# Patient Record
Sex: MALE | Race: WHITE | NOT HISPANIC OR LATINO | Employment: OTHER | URBAN - METROPOLITAN AREA
[De-identification: names, ages, dates, MRNs, and addresses within clinical notes are randomized per-mention and may not be internally consistent; named-entity substitution may affect disease eponyms.]

---

## 2017-04-09 ENCOUNTER — COMMUNICATION - HEALTHEAST (OUTPATIENT)
Dept: FAMILY MEDICINE | Facility: CLINIC | Age: 72
End: 2017-04-09

## 2017-04-25 ENCOUNTER — HOSPITAL ENCOUNTER (OUTPATIENT)
Dept: LAB | Age: 72
Setting detail: SPECIMEN
Discharge: HOME | End: 2017-04-25

## 2017-04-25 ENCOUNTER — OFFICE VISIT - HEALTHEAST (OUTPATIENT)
Dept: FAMILY MEDICINE | Facility: CLINIC | Age: 72
End: 2017-04-25

## 2017-04-25 DIAGNOSIS — E78.5 HYPERLIPEMIA: ICD-10-CM

## 2017-04-25 DIAGNOSIS — I10 ESSENTIAL HYPERTENSION: ICD-10-CM

## 2017-04-25 DIAGNOSIS — E11.9 DIABETES (H): ICD-10-CM

## 2017-04-25 DIAGNOSIS — Z00.00 WELL ADULT EXAM: ICD-10-CM

## 2017-04-25 DIAGNOSIS — E66.9 OBESITY (BMI 35.0-39.9 WITHOUT COMORBIDITY): ICD-10-CM

## 2017-04-25 LAB
CHOLEST SERPL-MCNC: 164 MG/DL
FASTING STATUS PATIENT QL REPORTED: YES
HBA1C MFR BLD: 7.3 % (ref 3.5–6)
HDLC SERPL-MCNC: 37 MG/DL
LDLC SERPL CALC-MCNC: 78 MG/DL
PSA SERPL-MCNC: 5.5 NG/ML (ref 0–6.5)
TRIGL SERPL-MCNC: 243 MG/DL

## 2017-04-25 ASSESSMENT — MIFFLIN-ST. JEOR: SCORE: 1770.92

## 2017-04-28 ENCOUNTER — COMMUNICATION - HEALTHEAST (OUTPATIENT)
Dept: FAMILY MEDICINE | Facility: CLINIC | Age: 72
End: 2017-04-28

## 2017-12-01 ENCOUNTER — COMMUNICATION - HEALTHEAST (OUTPATIENT)
Dept: FAMILY MEDICINE | Facility: CLINIC | Age: 72
End: 2017-12-01

## 2017-12-01 DIAGNOSIS — E11.9 DIABETES (H): ICD-10-CM

## 2018-02-06 ENCOUNTER — COMMUNICATION - HEALTHEAST (OUTPATIENT)
Dept: FAMILY MEDICINE | Facility: CLINIC | Age: 73
End: 2018-02-06

## 2018-02-06 DIAGNOSIS — E11.9 DIABETES (H): ICD-10-CM

## 2018-02-06 DIAGNOSIS — I10 ESSENTIAL HYPERTENSION: ICD-10-CM

## 2018-02-06 DIAGNOSIS — E78.5 HYPERLIPEMIA: ICD-10-CM

## 2018-02-13 ENCOUNTER — COMMUNICATION - HEALTHEAST (OUTPATIENT)
Dept: ADMINISTRATIVE | Facility: CLINIC | Age: 73
End: 2018-02-13

## 2018-02-15 ENCOUNTER — COMMUNICATION - HEALTHEAST (OUTPATIENT)
Dept: FAMILY MEDICINE | Facility: CLINIC | Age: 73
End: 2018-02-15

## 2018-02-15 ENCOUNTER — OFFICE VISIT - HEALTHEAST (OUTPATIENT)
Dept: FAMILY MEDICINE | Facility: CLINIC | Age: 73
End: 2018-02-15

## 2018-02-15 DIAGNOSIS — I10 ESSENTIAL HYPERTENSION: ICD-10-CM

## 2018-02-15 DIAGNOSIS — E11.9 DIABETES (H): ICD-10-CM

## 2018-02-15 DIAGNOSIS — Z12.83 SKIN CANCER SCREENING: ICD-10-CM

## 2018-02-15 LAB
ANION GAP SERPL CALCULATED.3IONS-SCNC: 8 MMOL/L (ref 5–18)
BUN SERPL-MCNC: 19 MG/DL (ref 8–28)
CALCIUM SERPL-MCNC: 9.4 MG/DL (ref 8.5–10.5)
CHLORIDE BLD-SCNC: 102 MMOL/L (ref 98–107)
CHOLEST SERPL-MCNC: 183 MG/DL
CO2 SERPL-SCNC: 27 MMOL/L (ref 22–31)
CREAT SERPL-MCNC: 1.48 MG/DL (ref 0.7–1.3)
CREAT UR-MCNC: 159.4 MG/DL
FASTING STATUS PATIENT QL REPORTED: YES
GFR SERPL CREATININE-BSD FRML MDRD: 47 ML/MIN/1.73M2
GLUCOSE BLD-MCNC: 172 MG/DL (ref 70–125)
HBA1C MFR BLD: 8.3 % (ref 3.5–6)
HDLC SERPL-MCNC: 38 MG/DL
LDLC SERPL CALC-MCNC: 92 MG/DL
MICROALBUMIN UR-MCNC: 8.82 MG/DL (ref 0–1.99)
MICROALBUMIN/CREAT UR: 55.3 MG/G
POTASSIUM BLD-SCNC: 4.5 MMOL/L (ref 3.5–5)
SODIUM SERPL-SCNC: 137 MMOL/L (ref 136–145)
TRIGL SERPL-MCNC: 263 MG/DL

## 2018-02-15 ASSESSMENT — MIFFLIN-ST. JEOR: SCORE: 1775.45

## 2018-02-19 ENCOUNTER — COMMUNICATION - HEALTHEAST (OUTPATIENT)
Dept: FAMILY MEDICINE | Facility: CLINIC | Age: 73
End: 2018-02-19

## 2018-02-19 DIAGNOSIS — E11.9 DIABETES (H): ICD-10-CM

## 2018-02-20 ENCOUNTER — COMMUNICATION - HEALTHEAST (OUTPATIENT)
Dept: FAMILY MEDICINE | Facility: CLINIC | Age: 73
End: 2018-02-20

## 2018-02-20 DIAGNOSIS — E11.9 DIABETES (H): ICD-10-CM

## 2018-02-21 ENCOUNTER — OFFICE VISIT - HEALTHEAST (OUTPATIENT)
Dept: FAMILY MEDICINE | Facility: CLINIC | Age: 73
End: 2018-02-21

## 2018-02-21 DIAGNOSIS — H61.21 RIGHT EAR IMPACTED CERUMEN: ICD-10-CM

## 2018-06-07 ENCOUNTER — OFFICE VISIT - HEALTHEAST (OUTPATIENT)
Dept: FAMILY MEDICINE | Facility: CLINIC | Age: 73
End: 2018-06-07

## 2018-06-07 DIAGNOSIS — E11.9 TYPE 2 DIABETES MELLITUS (H): ICD-10-CM

## 2018-06-07 DIAGNOSIS — M54.32 SCIATICA OF LEFT SIDE: ICD-10-CM

## 2018-06-07 DIAGNOSIS — Z12.11 SCREEN FOR COLON CANCER: ICD-10-CM

## 2018-06-07 LAB — HBA1C MFR BLD: 6.7 % (ref 3.5–6)

## 2018-06-07 ASSESSMENT — MIFFLIN-ST. JEOR: SCORE: 1740.86

## 2018-12-03 ENCOUNTER — COMMUNICATION - HEALTHEAST (OUTPATIENT)
Dept: FAMILY MEDICINE | Facility: CLINIC | Age: 73
End: 2018-12-03

## 2018-12-03 DIAGNOSIS — E11.9 DIABETES (H): ICD-10-CM

## 2019-03-05 ENCOUNTER — COMMUNICATION - HEALTHEAST (OUTPATIENT)
Dept: FAMILY MEDICINE | Facility: CLINIC | Age: 74
End: 2019-03-05

## 2019-03-05 DIAGNOSIS — E78.5 HYPERLIPEMIA: ICD-10-CM

## 2019-03-05 DIAGNOSIS — I10 ESSENTIAL HYPERTENSION: ICD-10-CM

## 2019-03-06 ENCOUNTER — COMMUNICATION - HEALTHEAST (OUTPATIENT)
Dept: FAMILY MEDICINE | Facility: CLINIC | Age: 74
End: 2019-03-06

## 2019-03-06 DIAGNOSIS — E11.9 DIABETES (H): ICD-10-CM

## 2019-04-16 ENCOUNTER — COMMUNICATION - HEALTHEAST (OUTPATIENT)
Dept: FAMILY MEDICINE | Facility: CLINIC | Age: 74
End: 2019-04-16

## 2019-04-30 ENCOUNTER — OFFICE VISIT - HEALTHEAST (OUTPATIENT)
Dept: FAMILY MEDICINE | Facility: CLINIC | Age: 74
End: 2019-04-30

## 2019-04-30 DIAGNOSIS — Z12.11 SCREEN FOR COLON CANCER: ICD-10-CM

## 2019-04-30 DIAGNOSIS — E11.9 TYPE 2 DIABETES MELLITUS WITHOUT COMPLICATION, WITHOUT LONG-TERM CURRENT USE OF INSULIN (H): ICD-10-CM

## 2019-04-30 DIAGNOSIS — I10 ESSENTIAL HYPERTENSION: ICD-10-CM

## 2019-04-30 DIAGNOSIS — E66.01 MORBID OBESITY (H): ICD-10-CM

## 2019-04-30 LAB
ALBUMIN SERPL-MCNC: 4.3 G/DL (ref 3.5–5)
ALP SERPL-CCNC: 69 U/L (ref 45–120)
ALT SERPL W P-5'-P-CCNC: 41 U/L (ref 0–45)
ANION GAP SERPL CALCULATED.3IONS-SCNC: 12 MMOL/L (ref 5–18)
AST SERPL W P-5'-P-CCNC: 33 U/L (ref 0–40)
BILIRUB SERPL-MCNC: 0.7 MG/DL (ref 0–1)
BUN SERPL-MCNC: 17 MG/DL (ref 8–28)
CALCIUM SERPL-MCNC: 9.7 MG/DL (ref 8.5–10.5)
CHLORIDE BLD-SCNC: 101 MMOL/L (ref 98–107)
CHOLEST SERPL-MCNC: 173 MG/DL
CO2 SERPL-SCNC: 23 MMOL/L (ref 22–31)
CREAT SERPL-MCNC: 1.51 MG/DL (ref 0.7–1.3)
CREAT UR-MCNC: 170.7 MG/DL
FASTING STATUS PATIENT QL REPORTED: YES
GFR SERPL CREATININE-BSD FRML MDRD: 46 ML/MIN/1.73M2
GLUCOSE BLD-MCNC: 124 MG/DL (ref 70–125)
HBA1C MFR BLD: 7.3 % (ref 3.5–6)
HDLC SERPL-MCNC: 37 MG/DL
LDLC SERPL CALC-MCNC: 81 MG/DL
MICROALBUMIN UR-MCNC: 5.85 MG/DL (ref 0–1.99)
MICROALBUMIN/CREAT UR: 34.3 MG/G
POTASSIUM BLD-SCNC: 4.7 MMOL/L (ref 3.5–5)
PROT SERPL-MCNC: 7.6 G/DL (ref 6–8)
SODIUM SERPL-SCNC: 136 MMOL/L (ref 136–145)
TRIGL SERPL-MCNC: 276 MG/DL

## 2019-04-30 ASSESSMENT — MIFFLIN-ST. JEOR: SCORE: 1753.91

## 2019-05-07 ENCOUNTER — COMMUNICATION - HEALTHEAST (OUTPATIENT)
Dept: FAMILY MEDICINE | Facility: CLINIC | Age: 74
End: 2019-05-07

## 2019-05-13 ENCOUNTER — COMMUNICATION - HEALTHEAST (OUTPATIENT)
Dept: FAMILY MEDICINE | Facility: CLINIC | Age: 74
End: 2019-05-13

## 2019-07-25 ENCOUNTER — COMMUNICATION - HEALTHEAST (OUTPATIENT)
Dept: FAMILY MEDICINE | Facility: CLINIC | Age: 74
End: 2019-07-25

## 2019-07-25 DIAGNOSIS — E78.5 HYPERLIPEMIA: ICD-10-CM

## 2019-07-25 DIAGNOSIS — I10 ESSENTIAL HYPERTENSION: ICD-10-CM

## 2019-07-25 DIAGNOSIS — E11.9 DIABETES (H): ICD-10-CM

## 2019-10-07 ENCOUNTER — COMMUNICATION - HEALTHEAST (OUTPATIENT)
Dept: FAMILY MEDICINE | Facility: CLINIC | Age: 74
End: 2019-10-07

## 2019-10-07 DIAGNOSIS — E11.9 DIABETES (H): ICD-10-CM

## 2020-03-11 ENCOUNTER — COMMUNICATION - HEALTHEAST (OUTPATIENT)
Dept: FAMILY MEDICINE | Facility: CLINIC | Age: 75
End: 2020-03-11

## 2020-03-11 DIAGNOSIS — E78.5 HYPERLIPEMIA: ICD-10-CM

## 2020-03-11 DIAGNOSIS — I10 ESSENTIAL HYPERTENSION: ICD-10-CM

## 2020-03-17 ENCOUNTER — COMMUNICATION - HEALTHEAST (OUTPATIENT)
Dept: FAMILY MEDICINE | Facility: CLINIC | Age: 75
End: 2020-03-17

## 2020-03-17 DIAGNOSIS — H04.123 DRY EYES: ICD-10-CM

## 2020-03-20 ENCOUNTER — COMMUNICATION - HEALTHEAST (OUTPATIENT)
Dept: FAMILY MEDICINE | Facility: CLINIC | Age: 75
End: 2020-03-20

## 2020-06-18 ENCOUNTER — COMMUNICATION - HEALTHEAST (OUTPATIENT)
Dept: FAMILY MEDICINE | Facility: CLINIC | Age: 75
End: 2020-06-18

## 2020-06-18 DIAGNOSIS — H04.123 DRY EYES: ICD-10-CM

## 2020-06-18 DIAGNOSIS — I10 ESSENTIAL HYPERTENSION: ICD-10-CM

## 2020-06-18 DIAGNOSIS — E78.5 HYPERLIPEMIA: ICD-10-CM

## 2020-06-18 DIAGNOSIS — E11.9 DIABETES (H): ICD-10-CM

## 2020-07-06 ENCOUNTER — COMMUNICATION - HEALTHEAST (OUTPATIENT)
Dept: FAMILY MEDICINE | Facility: CLINIC | Age: 75
End: 2020-07-06

## 2020-07-06 ENCOUNTER — OFFICE VISIT - HEALTHEAST (OUTPATIENT)
Dept: FAMILY MEDICINE | Facility: CLINIC | Age: 75
End: 2020-07-06

## 2020-07-06 DIAGNOSIS — E11.9 TYPE 2 DIABETES MELLITUS WITHOUT COMPLICATION, WITHOUT LONG-TERM CURRENT USE OF INSULIN (H): ICD-10-CM

## 2020-07-06 DIAGNOSIS — I10 ESSENTIAL HYPERTENSION: ICD-10-CM

## 2020-07-06 DIAGNOSIS — E78.5 HYPERLIPIDEMIA, UNSPECIFIED HYPERLIPIDEMIA TYPE: ICD-10-CM

## 2020-07-06 DIAGNOSIS — Z11.59 SCREENING FOR VIRAL DISEASE: ICD-10-CM

## 2020-07-06 DIAGNOSIS — E66.01 MORBID OBESITY (H): ICD-10-CM

## 2020-07-09 ENCOUNTER — AMBULATORY - HEALTHEAST (OUTPATIENT)
Dept: NURSING | Facility: CLINIC | Age: 75
End: 2020-07-09

## 2020-07-09 ENCOUNTER — AMBULATORY - HEALTHEAST (OUTPATIENT)
Dept: LAB | Facility: CLINIC | Age: 75
End: 2020-07-09

## 2020-07-09 DIAGNOSIS — E78.5 HYPERLIPIDEMIA, UNSPECIFIED HYPERLIPIDEMIA TYPE: ICD-10-CM

## 2020-07-09 DIAGNOSIS — I10 ESSENTIAL HYPERTENSION: ICD-10-CM

## 2020-07-09 DIAGNOSIS — Z11.59 SCREENING FOR VIRAL DISEASE: ICD-10-CM

## 2020-07-09 DIAGNOSIS — E11.9 TYPE 2 DIABETES MELLITUS WITHOUT COMPLICATION, WITHOUT LONG-TERM CURRENT USE OF INSULIN (H): ICD-10-CM

## 2020-07-09 LAB
ANION GAP SERPL CALCULATED.3IONS-SCNC: 12 MMOL/L (ref 5–18)
CHLORIDE BLD-SCNC: 100 MMOL/L (ref 98–107)
CHOLEST SERPL-MCNC: 151 MG/DL
CO2 SERPL-SCNC: 22 MMOL/L (ref 22–31)
CREAT SERPL-MCNC: 1.58 MG/DL (ref 0.7–1.3)
FASTING STATUS PATIENT QL REPORTED: YES
GFR SERPL CREATININE-BSD FRML MDRD: 43 ML/MIN/1.73M2
HBA1C MFR BLD: 6.1 % (ref 3.5–6)
HDLC SERPL-MCNC: 34 MG/DL
LDLC SERPL CALC-MCNC: 82 MG/DL
POTASSIUM BLD-SCNC: 4.2 MMOL/L (ref 3.5–5)
SODIUM SERPL-SCNC: 134 MMOL/L (ref 136–145)
TRIGL SERPL-MCNC: 176 MG/DL

## 2020-07-10 ENCOUNTER — COMMUNICATION - HEALTHEAST (OUTPATIENT)
Dept: FAMILY MEDICINE | Facility: CLINIC | Age: 75
End: 2020-07-10

## 2020-07-10 LAB — HCV AB SERPL QL IA: NEGATIVE

## 2020-07-23 ENCOUNTER — COMMUNICATION - HEALTHEAST (OUTPATIENT)
Dept: FAMILY MEDICINE | Facility: CLINIC | Age: 75
End: 2020-07-23

## 2020-07-23 DIAGNOSIS — E11.9 DIABETES (H): ICD-10-CM

## 2020-07-23 DIAGNOSIS — H04.123 DRY EYES: ICD-10-CM

## 2020-07-23 DIAGNOSIS — I10 ESSENTIAL HYPERTENSION: ICD-10-CM

## 2020-07-23 DIAGNOSIS — E78.5 HYPERLIPEMIA: ICD-10-CM

## 2020-08-20 ENCOUNTER — COMMUNICATION - HEALTHEAST (OUTPATIENT)
Dept: FAMILY MEDICINE | Facility: CLINIC | Age: 75
End: 2020-08-20

## 2020-08-20 DIAGNOSIS — E78.5 HYPERLIPEMIA: ICD-10-CM

## 2020-08-20 DIAGNOSIS — E11.9 DIABETES (H): ICD-10-CM

## 2020-08-20 DIAGNOSIS — I10 ESSENTIAL HYPERTENSION: ICD-10-CM

## 2020-08-20 DIAGNOSIS — H04.123 DRY EYES: ICD-10-CM

## 2020-09-15 ENCOUNTER — RECORDS - HEALTHEAST (OUTPATIENT)
Dept: ADMINISTRATIVE | Facility: OTHER | Age: 75
End: 2020-09-15

## 2020-09-15 LAB — RETINOPATHY: NEGATIVE

## 2020-09-23 ENCOUNTER — RECORDS - HEALTHEAST (OUTPATIENT)
Dept: HEALTH INFORMATION MANAGEMENT | Facility: CLINIC | Age: 75
End: 2020-09-23

## 2021-01-21 ENCOUNTER — COMMUNICATION - HEALTHEAST (OUTPATIENT)
Dept: FAMILY MEDICINE | Facility: CLINIC | Age: 76
End: 2021-01-21

## 2021-01-21 DIAGNOSIS — H04.123 DRY EYES: ICD-10-CM

## 2021-03-16 ENCOUNTER — COMMUNICATION - HEALTHEAST (OUTPATIENT)
Dept: FAMILY MEDICINE | Facility: CLINIC | Age: 76
End: 2021-03-16

## 2021-03-16 DIAGNOSIS — H04.123 DRY EYES: ICD-10-CM

## 2021-03-16 DIAGNOSIS — I10 ESSENTIAL HYPERTENSION: ICD-10-CM

## 2021-03-16 DIAGNOSIS — E78.5 HYPERLIPEMIA: ICD-10-CM

## 2021-03-16 DIAGNOSIS — E11.9 DIABETES (H): ICD-10-CM

## 2021-03-24 ENCOUNTER — COMMUNICATION - HEALTHEAST (OUTPATIENT)
Dept: FAMILY MEDICINE | Facility: CLINIC | Age: 76
End: 2021-03-24

## 2021-03-24 DIAGNOSIS — H04.123 DRY EYES: ICD-10-CM

## 2021-05-06 ENCOUNTER — OFFICE VISIT (OUTPATIENT)
Dept: FAMILY MEDICINE | Facility: CLINIC | Age: 76
End: 2021-05-06
Payer: MEDICARE

## 2021-05-06 VITALS
WEIGHT: 225 LBS | HEART RATE: 63 BPM | SYSTOLIC BLOOD PRESSURE: 110 MMHG | HEIGHT: 68 IN | DIASTOLIC BLOOD PRESSURE: 80 MMHG | RESPIRATION RATE: 14 BRPM | TEMPERATURE: 97.4 F | BODY MASS INDEX: 34.1 KG/M2 | OXYGEN SATURATION: 95 %

## 2021-05-06 DIAGNOSIS — R97.20 ELEVATED PROSTATE SPECIFIC ANTIGEN (PSA): ICD-10-CM

## 2021-05-06 DIAGNOSIS — E66.01 MORBID OBESITY (H): ICD-10-CM

## 2021-05-06 DIAGNOSIS — K76.0 NAFLD (NONALCOHOLIC FATTY LIVER DISEASE): ICD-10-CM

## 2021-05-06 DIAGNOSIS — E78.5 HYPERLIPIDEMIA LDL GOAL <100: ICD-10-CM

## 2021-05-06 DIAGNOSIS — I10 ESSENTIAL HYPERTENSION: ICD-10-CM

## 2021-05-06 DIAGNOSIS — Z12.5 ENCOUNTER FOR SCREENING FOR MALIGNANT NEOPLASM OF PROSTATE: ICD-10-CM

## 2021-05-06 DIAGNOSIS — E55.9 VITAMIN D DEFICIENCY: ICD-10-CM

## 2021-05-06 DIAGNOSIS — Z00.00 ENCOUNTER FOR MEDICARE ANNUAL WELLNESS EXAM: Primary | ICD-10-CM

## 2021-05-06 DIAGNOSIS — E11.9 TYPE 2 DIABETES MELLITUS WITHOUT COMPLICATION, WITHOUT LONG-TERM CURRENT USE OF INSULIN (H): ICD-10-CM

## 2021-05-06 PROBLEM — K76.89 OTHER CHRONIC NONALCOHOLIC LIVER DISEASE: Status: ACTIVE | Noted: 2021-05-06

## 2021-05-06 LAB
ALBUMIN SERPL-MCNC: 4.1 G/DL (ref 3.4–5)
ALP SERPL-CCNC: 78 U/L (ref 40–150)
ALT SERPL W P-5'-P-CCNC: 36 U/L (ref 0–70)
ANION GAP SERPL CALCULATED.3IONS-SCNC: 6 MMOL/L (ref 3–14)
AST SERPL W P-5'-P-CCNC: 26 U/L (ref 0–45)
BILIRUB SERPL-MCNC: 0.8 MG/DL (ref 0.2–1.3)
BUN SERPL-MCNC: 14 MG/DL (ref 7–30)
CALCIUM SERPL-MCNC: 8.9 MG/DL (ref 8.5–10.1)
CHLORIDE SERPL-SCNC: 100 MMOL/L (ref 94–109)
CHOLEST SERPL-MCNC: 160 MG/DL
CO2 SERPL-SCNC: 27 MMOL/L (ref 20–32)
CREAT SERPL-MCNC: 1.42 MG/DL (ref 0.66–1.25)
CREAT UR-MCNC: 149 MG/DL
GFR SERPL CREATININE-BSD FRML MDRD: 48 ML/MIN/{1.73_M2}
GLUCOSE SERPL-MCNC: 114 MG/DL (ref 70–99)
HBA1C MFR BLD: 6.5 % (ref 0–5.6)
HDLC SERPL-MCNC: 43 MG/DL
LDLC SERPL CALC-MCNC: 84 MG/DL
MICROALBUMIN UR-MCNC: 50 MG/L
MICROALBUMIN/CREAT UR: 33.49 MG/G CR (ref 0–17)
NONHDLC SERPL-MCNC: 117 MG/DL
POTASSIUM SERPL-SCNC: 3.9 MMOL/L (ref 3.4–5.3)
PROT SERPL-MCNC: 8.2 G/DL (ref 6.8–8.8)
PSA SERPL-ACNC: 6.83 UG/L (ref 0–4)
SODIUM SERPL-SCNC: 133 MMOL/L (ref 133–144)
TRIGL SERPL-MCNC: 166 MG/DL

## 2021-05-06 PROCEDURE — 80061 LIPID PANEL: CPT | Performed by: NURSE PRACTITIONER

## 2021-05-06 PROCEDURE — 83036 HEMOGLOBIN GLYCOSYLATED A1C: CPT | Performed by: NURSE PRACTITIONER

## 2021-05-06 PROCEDURE — 36415 COLL VENOUS BLD VENIPUNCTURE: CPT | Performed by: NURSE PRACTITIONER

## 2021-05-06 PROCEDURE — G0103 PSA SCREENING: HCPCS | Performed by: NURSE PRACTITIONER

## 2021-05-06 PROCEDURE — 99214 OFFICE O/P EST MOD 30 MIN: CPT | Mod: 25 | Performed by: NURSE PRACTITIONER

## 2021-05-06 PROCEDURE — 82043 UR ALBUMIN QUANTITATIVE: CPT | Performed by: NURSE PRACTITIONER

## 2021-05-06 PROCEDURE — 80053 COMPREHEN METABOLIC PANEL: CPT | Performed by: NURSE PRACTITIONER

## 2021-05-06 PROCEDURE — G0439 PPPS, SUBSEQ VISIT: HCPCS | Performed by: NURSE PRACTITIONER

## 2021-05-06 RX ORDER — PRAVASTATIN SODIUM 10 MG
10 TABLET ORAL AT BEDTIME
Qty: 90 TABLET | Refills: 3 | Status: SHIPPED | OUTPATIENT
Start: 2021-05-06 | End: 2022-05-12

## 2021-05-06 RX ORDER — DORZOLAMIDE HYDROCHLORIDE AND TIMOLOL MALEATE 20; 5 MG/ML; MG/ML
SOLUTION/ DROPS OPHTHALMIC
COMMUNITY
Start: 2021-03-16 | End: 2023-05-18

## 2021-05-06 RX ORDER — PRAVASTATIN SODIUM 10 MG
TABLET ORAL
COMMUNITY
Start: 2021-03-18 | End: 2021-05-06

## 2021-05-06 RX ORDER — LATANOPROST 50 UG/ML
SOLUTION/ DROPS OPHTHALMIC
COMMUNITY
Start: 2021-04-13 | End: 2021-08-11

## 2021-05-06 RX ORDER — METFORMIN HCL 500 MG
TABLET, EXTENDED RELEASE 24 HR ORAL
COMMUNITY
Start: 2021-03-18 | End: 2021-05-06

## 2021-05-06 RX ORDER — BUTALB/ACETAMINOPHEN/CAFFEINE 50-325-40
30 TABLET ORAL
COMMUNITY

## 2021-05-06 RX ORDER — LANCETS
EACH MISCELLANEOUS
COMMUNITY
Start: 2019-07-25

## 2021-05-06 RX ORDER — LISINOPRIL/HYDROCHLOROTHIAZIDE 10-12.5 MG
TABLET ORAL
COMMUNITY
Start: 2021-03-18 | End: 2021-05-06

## 2021-05-06 RX ORDER — IBUPROFEN 800 MG/1
TABLET, FILM COATED ORAL
COMMUNITY
Start: 2021-01-25 | End: 2024-09-24

## 2021-05-06 RX ORDER — LISINOPRIL/HYDROCHLOROTHIAZIDE 10-12.5 MG
1 TABLET ORAL DAILY
Qty: 90 TABLET | Refills: 3 | Status: SHIPPED | OUTPATIENT
Start: 2021-05-06 | End: 2021-05-07 | Stop reason: SINTOL

## 2021-05-06 RX ORDER — METFORMIN HCL 500 MG
TABLET, EXTENDED RELEASE 24 HR ORAL
Qty: 90 TABLET | Refills: 3 | Status: SHIPPED | OUTPATIENT
Start: 2021-05-06 | End: 2022-04-15

## 2021-05-06 ASSESSMENT — PAIN SCALES - GENERAL: PAINLEVEL: NO PAIN (0)

## 2021-05-06 ASSESSMENT — ACTIVITIES OF DAILY LIVING (ADL): CURRENT_FUNCTION: NO ASSISTANCE NEEDED

## 2021-05-06 ASSESSMENT — MIFFLIN-ST. JEOR: SCORE: 1730.09

## 2021-05-06 NOTE — PATIENT INSTRUCTIONS
Patient Education   Personalized Prevention Plan  You are due for the preventive services outlined below.  Your care team is available to assist you in scheduling these services.  If you have already completed any of these items, please share that information with your care team to update in your medical record.  Health Maintenance Due   Topic Date Due     Diabetic Foot Exam  Never done     Eye Exam  Never done     Colorectal Cancer Screening  Never done     COVID-19 Vaccine (1) Never done     Hepatitis C Screening  Never done     FALL RISK ASSESSMENT  Never done     AORTIC ANEURYSM SCREENING (SYSTEM ASSIGNED)  Never done     Zoster (Shingles) Vaccine (2 of 3) 04/12/2011     Diptheria Tetanus Pertussis (DTAP/TDAP/TD) Vaccine (2 - Td) 03/07/2018       Urinary Incontinence (Male)    Urinary incontinence means not being able to control the release of urine from the bladder.   Causes  Common causes of urinary incontinence in men include:    Infection    Certain medicines    Aging    Poor pelvic muscle tone    Bladder spasms    Obesity    Trouble urinating and fully emptying the bladder (urinary retention)  Other things that can cause incontinence are:     Nervous system diseases    Diabetes    Sleep apnea    Urinary tract infections    Prostate surgery    Pelvic injury  Constipation and smoking have also been identified as risk factors.   Symptoms    Urge incontinence (overactive bladder). This is a sudden urge to urinate. It occurs even though there may not be much urine in the bladder. The need to urinate often during the night is common. It's due to bladder spasms.    Stress incontinence. This is urine leakage that you can't control. It can occur with sneezing, coughing, and other actions that put stress on the bladder.    Treatment  Treatment depends on what is causing the condition. Bladder infections are treated with antibiotics. Urinary retention is treated with a bladder catheter.   Home care  Follow these  guidelines when caring for yourself at home:    Don't have any foods and drinks that may irritate the bladder. This includes:  ? Chocolate  ? Alcohol  ? Caffeine  ? Carbonated drinks  ? Acidic fruits and juices    Limit fluids to 6 to 8 cups a day.    Lose weight if you are overweight. This will reduce your symptoms.    If advised, do regular pelvic muscle-strengthening exercises such as Kegel exercises.    If needed, wear absorbent pads to catch urine. Change the pads often. This is for good hygiene and to prevent skin and bladder infections.    Bathe daily for good hygiene.    If an antibiotic was prescribed to treat a bladder infection, take it until it's finished. Keep taking it even if you are feeling better. This is to make sure your infection has cleared.    If a catheter was left in place, keep bacteria from getting into the collection bag. Don't disconnect the catheter from the collection bag.    Use a leg band to secure the catheter drainage tube, so it does not pull on the catheter. Drain the collection bag when it becomes full. To do this, use the drain spout at the bottom of the bag. Don't disconnect the bag from the catheter.    Don't pull on or try to remove a catheter. The catheter must be removed by a healthcare provider.    If you smoke, stop. Ask your provider for help if you can't do this on your own.  Follow-up care  Follow up with your healthcare provider, or as advised.  When to get medical advice  Call your healthcare provider right away if any of these occur:    Fever over 100.4 F (38 C), or as directed by your provider    Bladder pain or fullness    Belly swelling, nausea, or vomiting    Back pain    Weakness, dizziness, or fainting    If a catheter was left in place, return if:  ? The catheter falls out  ? The catheter stops draining for 6 hours  ? Your urine gets cloudy or smells bad  [facility] last reviewed this educational content on 1/1/2020 2000-2021 [employer]. All  rights reserved. This information is not intended as a substitute for professional medical care. Always follow your healthcare professional's instructions.

## 2021-05-06 NOTE — LETTER
"May 7, 2021      Keyshawn Potter  [address] [facility] [address]        Dear ,    We are writing to inform you of your test results.    Wanted to let you know I am changing your blood pressure medication to see if I can improve your kidney function by discontinuing the \"water pill\" part of the pill.     I also placed a referral to urology to review your elevated PSA to rule out prostate cancer. It looks like you have a  history of elevated PSA in the past.    Your labs show minor impact from diabetes on your kidney function. It would be worthwhile to discontinue the lisinopril-hydrochlorothiazide and have you only take the lisinopril. This may improve your kidney function overall. I will send this to [facility] for you. Your A1c is nice and stable. Your cholesterol is nice and controlled. No other medication changes needed at this time. Follow up with a blood pressure check when you come back up to the clinic for your eye exam. This can be done as a nurse only visit if you would like. It was nice to see you.    Resulted Orders   Comprehensive metabolic panel   Result Value Ref Range    Sodium 133 133 - 144 mmol/L    Potassium 3.9 3.4 - 5.3 mmol/L    Chloride 100 94 - 109 mmol/L    Carbon Dioxide 27 20 - 32 mmol/L    Anion Gap 6 3 - 14 mmol/L    Glucose 114 (H) 70 - 99 mg/dL    Urea Nitrogen 14 7 - 30 mg/dL    Creatinine 1.42 (H) 0.66 - 1.25 mg/dL    GFR Estimate 48 (L) >60 mL/min/[1.73_m2]      Comment:      Non  GFR Calc  Starting 12/18/2018, serum creatinine based estimated GFR (eGFR) will be   calculated using the Chronic Kidney Disease Epidemiology Collaboration   (CKD-EPI) equation.      GFR Estimate If Black 55 (L) >60 mL/min/[1.73_m2]      Comment:       GFR Calc  Starting 12/18/2018, serum creatinine based estimated GFR (eGFR) will be   calculated using the Chronic Kidney Disease Epidemiology Collaboration   (CKD-EPI) equation.      Calcium 8.9 8.5 - 10.1 mg/dL    " Bilirubin Total 0.8 0.2 - 1.3 mg/dL    Albumin 4.1 3.4 - 5.0 g/dL    Protein Total 8.2 6.8 - 8.8 g/dL    Alkaline Phosphatase 78 40 - 150 U/L    ALT 36 0 - 70 U/L    AST 26 0 - 45 U/L   Hemoglobin A1c   Result Value Ref Range    Hemoglobin A1C 6.5 (H) 0 - 5.6 %      Comment:      Normal <5.7% Prediabetes 5.7-6.4%  Diabetes 6.5% or higher - adopted from ADA   consensus guidelines.     Lipid panel reflex to direct LDL Non-fasting   Result Value Ref Range    Cholesterol 160 <200 mg/dL    Triglycerides 166 (H) <150 mg/dL      Comment:      Borderline high:  150-199 mg/dl  High:             200-499 mg/dl  Very high:       >499 mg/dl      HDL Cholesterol 43 >39 mg/dL    LDL Cholesterol Calculated 84 <100 mg/dL      Comment:      Desirable:       <100 mg/dl    Non HDL Cholesterol 117 <130 mg/dL   Albumin Random Urine Quantitative with Creat Ratio   Result Value Ref Range    Creatinine Urine 149 mg/dL    Albumin Urine mg/L 50 mg/L    Albumin Urine mg/g Cr 33.49 (H) 0 - 17 mg/g Cr   PSA, screen   Result Value Ref Range    PSA 6.83 (H) 0 - 4 ug/L      Comment:      Assay Method:  Chemiluminescence using Siemens Vista analyzer       If you have any questions or concerns, please call the clinic at the number listed above.       Sincerely,      Lorene Rene, APRN CNP/nk

## 2021-05-06 NOTE — PROGRESS NOTES
"SUBJECTIVE:   Keyshawn Potter is a 75 year old male who presents for Preventive Visit.      Patient has been advised of split billing requirements and indicates understanding: Yes   Are you in the first 12 months of your Medicare coverage?  No    Healthy Habits:     In general, how would you rate your overall health?  Very good    Frequency of exercise:  6-7 days/week    Duration of exercise:  15-30 minutes    Do you usually eat at least 4 servings of fruit and vegetables a day, include whole grains    & fiber and avoid regularly eating high fat or \"junk\" foods?  Yes    Taking medications regularly:  Yes    Barriers to taking medications:  None    Medication side effects:  None    Ability to successfully perform activities of daily living:  No assistance needed    Home Safety:  No safety concerns identified    Hearing Impairment:  No hearing concerns    In the past 6 months, have you been bothered by leaking of urine? Yes    In general, how would you rate your overall mental or emotional health?  Excellent      PHQ-2 Total Score: 0    Additional concerns today:  Yes    Do you feel safe in your environment? Yes    Have you ever done Advance Care Planning? (For example, a Health Directive, POLST, or a discussion with a medical provider or your loved ones about your wishes): No, advance care planning information given to patient to review.  Patient declined advance care planning discussion at this time.       Fall risk  Fallen 2 or more times in the past year?: No  Any fall with injury in the past year?: No    Cognitive Screening   1) Repeat 3 items (Leader, Season, Table)    2) Clock draw: NORMAL  3) 3 item recall: Recalls 3 objects  Results: 3 items recalled: COGNITIVE IMPAIRMENT LESS LIKELY    Mini-CogTM Copyright LOU Harris. Licensed by the author for use in [facility]; reprinted with permission ([email or web address]). All rights reserved.      Do you have sleep apnea, excessive snoring or daytime drowsiness?: " no    Reviewed and updated as needed this visit by clinical staff  Tobacco  Allergies  Meds  Problems  Med Hx  Surg Hx  Fam Hx  Soc Hx          Reviewed and updated as needed this visit by Provider     Problems            Social History     Tobacco Use     Smoking status: Former Smoker     Smokeless tobacco: Never Used   Substance Use Topics     Alcohol use: Not Currently     If you drink alcohol do you typically have >3 drinks per day or >7 drinks per week? No    Alcohol Use 5/6/2021   Prescreen: >3 drinks/day or >7 drinks/week? No           Diabetes Follow-up    How often are you checking your blood sugar? A few times a week  What time of day are you checking your blood sugars (select all that apply)?  Before meals  Have you had any blood sugars above 200?  No  Have you had any blood sugars below 70?  No    What symptoms do you notice when your blood sugar is low?  None    What concerns do you have today about your diabetes? None     Do you have any of these symptoms? (Select all that apply)  No numbness or tingling in feet.  No redness, sores or blisters on feet.  No complaints of excessive thirst.  No reports of blurry vision.  No significant changes to weight.    Have you had a diabetic eye exam in the last 12 months? No                Hyperlipidemia Follow-Up      Are you regularly taking any medication or supplement to lower your cholesterol?   Yes- pravastatin    Are you having muscle aches or other side effects that you think could be caused by your cholesterol lowering medication?  No    Hypertension Follow-up      Do you check your blood pressure regularly outside of the clinic? No     Are you following a low salt diet? No    Are your blood pressures ever more than 140 on the top number (systolic) OR more   than 90 on the bottom number (diastolic), for example 140/90? No    BP Readings from Last 2 Encounters:   05/06/21 110/80     No results found for: A1C, LDL      Current providers sharing in  "care for this patient include:   Patient Care Team:  Lorene Rene APRN CNP as PCP - General (Nurse Practitioner - Family)    The following health maintenance items are reviewed in Epic and correct as of today:  Health Maintenance Due   Topic Date Due     DIABETIC FOOT EXAM  Never done     EYE EXAM  Never done     COLORECTAL CANCER SCREENING  Never done     COVID-19 Vaccine (1) Never done     HEPATITIS C SCREENING  Never done     FALL RISK ASSESSMENT  Never done     AORTIC ANEURYSM SCREENING (SYSTEM ASSIGNED)  Never done     ZOSTER IMMUNIZATION (2 of 3) 04/12/2011     DTAP/TDAP/TD IMMUNIZATION (2 - Td) 03/07/2018       Advised COVID vaccine.     Review of Systems  Constitutional, HEENT, cardiovascular, pulmonary, gi and gu systems are negative, except as otherwise noted.    OBJECTIVE:   /80 (BP Location: Right arm, Patient Position: Chair, Cuff Size: Adult Large)   Pulse 63   Temp 97.4  F (36.3  C) (Tympanic)   Resp 14   Ht 1.727 m (5' 8\")   Wt 102.1 kg (225 lb)   SpO2 95%   BMI 34.21 kg/m   Estimated body mass index is 34.21 kg/m  as calculated from the following:    Height as of this encounter: 1.727 m (5' 8\").    Weight as of this encounter: 102.1 kg (225 lb).     Physical Exam  GENERAL: healthy, alert and no distress  EYES: Eyes grossly normal to inspection, PERRL and conjunctivae and sclerae normal  HENT: ear canals and TM's normal, nose and mouth without ulcers or lesions  NECK: no adenopathy, no asymmetry, masses, or scars and thyroid normal to palpation  RESP: lungs clear to auscultation - no rales, rhonchi or wheezes  CV: regular rate and rhythm, normal S1 S2, no S3 or S4, no murmur, click or rub, no peripheral edema and peripheral pulses strong  ABDOMEN: soft, nontender, no hepatosplenomegaly, no masses and bowel sounds normal  MS: no gross musculoskeletal defects noted, no edema  SKIN: no suspicious lesions or rashes  NEURO: Normal strength and tone, mentation intact and speech " normal  PSYCH: mentation appears normal, affect normal/bright    Results for orders placed or performed in visit on 05/06/21 (from the past 24 hour(s))   Hemoglobin A1c   Result Value Ref Range    Hemoglobin A1C 6.5 (H) 0 - 5.6 %         ASSESSMENT / PLAN:     1. Encounter for Medicare annual wellness exam  Annual medicare visit completed. I discussed preventative measures with the patient including continuing with lifestyle modifications. I encouraged patient to follow-up yearly for annual preventative care visits and sooner as needed.       2. Type 2 diabetes mellitus without complication, without long-term current use of insulin (H)  Diabetes well controlled. A1c is 6.5%. Refilled metformin for patient.   - Hemoglobin A1c  - Lipid panel reflex to direct LDL Non-fasting  - Albumin Random Urine Quantitative with Creat Ratio    3. Essential hypertension  Refilled lisinopril-hydrochlorothiazide today. Stable BP.   - Comprehensive metabolic panel    4. Vitamin D deficiency  Continue with vitamin d supplement.     5. NAFLD (nonalcoholic fatty liver disease)  Will check liver enzymes.   - Comprehensive metabolic panel    6. Morbid obesity (H)  Encouraged him to continue with diet and exercise as able.     7. Elevated prostate specific antigen (PSA)  Screening today. Has been elevated in the past.   - PSA, screen    8. Hyperlipidemia LDL goal <100  Continue on pravastatin. Refilled today.   - Lipid panel reflex to direct LDL Non-fasting    9. Encounter for screening for malignant neoplasm of prostate   Screening.   - PSA, screen    Patient has been advised of split billing requirements and indicates understanding: Yes  COUNSELING:  Reviewed preventive health counseling, as reflected in patient instructions       Consider AAA screening for ages 65-75 and smoking history       Regular exercise       Healthy diet/nutrition    Estimated body mass index is 34.21 kg/m  as calculated from the following:    Height as of this  "encounter: 1.727 m (5' 8\").    Weight as of this encounter: 102.1 kg (225 lb).    Weight management plan: Discussed healthy diet and exercise guidelines    He reports that he has quit smoking. He has never used smokeless tobacco.      Appropriate preventive services were discussed with this patient, including applicable screening as appropriate for cardiovascular disease, diabetes, osteopenia/osteoporosis, and glaucoma.  As appropriate for age/gender, discussed screening for colorectal cancer, prostate cancer, breast cancer, and cervical cancer. Checklist reviewing preventive services available has been given to the patient.    Reviewed patients plan of care and provided an AVS. The Basic Care Plan (routine screening as documented in Health Maintenance) for Keyshawn meets the Care Plan requirement. This Care Plan has been established and reviewed with the Patient.    Counseling Resources:  ATP IV Guidelines  Pooled Cohorts Equation Calculator  Breast Cancer Risk Calculator  Breast Cancer: Medication to Reduce Risk  FRAX Risk Assessment  ICSI Preventive Guidelines  Dietary Guidelines for Americans, 2010  USDA's MyPlate  ASA Prophylaxis  Lung CA Screening    PATRICIA Alamo CNP  [facility]    Identified Health Risks:    Information on urinary incontinence and treatment options given to patient.  "

## 2021-05-07 ENCOUNTER — COMMUNICATION - HEALTHEAST (OUTPATIENT)
Dept: FAMILY MEDICINE | Facility: CLINIC | Age: 76
End: 2021-05-07

## 2021-05-07 DIAGNOSIS — H04.123 DRY EYES: ICD-10-CM

## 2021-05-07 RX ORDER — LISINOPRIL 10 MG/1
10 TABLET ORAL DAILY
Qty: 90 TABLET | Refills: 3 | Status: SHIPPED | OUTPATIENT
Start: 2021-05-07 | End: 2022-01-04

## 2021-05-07 NOTE — RESULT ENCOUNTER NOTE
Please see other note on labs and let patient know that I also placed a referral to urology to review his elevated PSA to rule out prostate cancer. He has had a history of elevated PSA in the past.   PATRICIA Alamo CNP

## 2021-05-07 NOTE — RESULT ENCOUNTER NOTE
"Letter for patient. Please call and let him know that I am changing his blood pressure medication to see if I can improve his kidney function by discontinuing the \"water pill\" part of the pill.   Keyshawn,   Your labs show minor impact from diabetes on your kidney function. It would be worthwhile to discontinue the lisinopril-hydrochlorothiazide and have you only take the lisinopril. This may improve your kidney function overall. I will send this to [facility] for you. Your A1c is nice and stable. Your cholesterol is nice and controlled. No other medication changes needed at this time. Follow up with a blood pressure check when you come back up to the clinic for your eye exam. This can be done as a nurse only visit if you would like. It was nice to see you.   PATRICIA Alamo CNP  "

## 2021-05-27 NOTE — TELEPHONE ENCOUNTER
Left message to call back for: Pt to call back on mainline to schedule an appointment    Information to relay to patient:  Pt is due for a DM F/U appointment.  Also Pt is also do for an AWV.  Please schedule 2 separate appointments for Pt to see his PCP.

## 2021-05-28 NOTE — TELEPHONE ENCOUNTER
Test Results  Who is calling?:  Patient   Who ordered the test:  Lorene Rene  Type of test: Lab  Date of test:  04.30.19  Where was the test performed:  [facility]   What are your questions/concerns?:  Wants his test results mailed to his home.  Okay to leave a detailed message?: No call back needed

## 2021-05-28 NOTE — PROGRESS NOTES
Assessment/ Plan:       1. Type 2 diabetes mellitus without complication, without long-term current use of insulin (H)  Doing well with current diabetes management.  A1c today is 7.3%.  I have provided him with refills in the beginning of March.  He will let me know when he needs no refills I will then send in another 3 months.  I will recommend that patient increase his metformin from 500 mg daily to 1000 mg daily.  In addition recommend that he continue on his aspirin as well as his pravastatin.  Blood work is pending as of this time further recommendations may change based on blood work.  Referral placed for ophthalmology would like patient to have screening completed.  He will schedule this at his convenience.  Refilled patient's blood glucose test strips today.   - Lipid Cascade  - Comprehensive Metabolic Panel  - Microalbumin, Random Urine  - Glycosylated Hemoglobin A1c  - Ambulatory referral to Ophthalmology  - blood glucose test (ACCU-CHEK CHRISS PLUS TEST STRP) strips; USE ONE STRIP 3 TIMES DAILY  Dispense: 200 strip; Refill: 1    2. Essential hypertension  Blood pressure currently stable on prescribed regimen of lisinopril and hydrochlorothiazide.  Discussed side effects with the patient and encouraged him to follow-up if symptoms are present of dizziness or hypotension.  Recommend continuing efforts at regular cardiovascular exercise and eating a balanced diet that is also low in sodium.  Continue reevaluation every 6 months.    3. Morbid obesity (H)  History of morbid obesity with a BMI of 35.8.  Recommend that he continue to work on his efforts at weight loss and managing a balanced diet and getting regular cardiovascular exercise.  If he were to be successful with this he may be able to reduce his medication needs. The following high BMI interventions were performed this visit: encouragement to exercise, dietary management education, guidance, and counseling and lifestyle education regarding  diet      4. Screen for colon cancer  Fecal occult FIT screening test ordered today and provided to patient.  - Occult Blood(ICT)        Subjective:      Keyshawn Potter is a 73 y.o. male who presents for follow up of diabetes. He has been doing well overall. He checks his blood sugar daily and fasting sugar is 120, he did not bring in a glucose log today.  He continues on his medications as prescribed.  He denies any concerning side effects today.  He denies any chest pain, shortness of breath, or difficulty breathing.  He continues on low-dose lisinopril with hydrochlorothiazide.  He takes 10 mg pravastatin for lipid reduction.  He is requesting a refill of his lancets.  He is declining immunizations today it appears he would be due for pneumococcal and Shingrix.  If he continues to winter in [place].  This year he also went to [place] and [place].  He reports he does his best at eating a balanced diet and staying active.  He is overdue for an ophthalmology exam and reports that he will get that scheduled he typically goes to [facility].  He is also overdue for screening for colon cancer he prefers to do the fecal occult FIT testing.  He denies any numbness or tingling in his feet. He denies any other questions or concerns today.    The following portions of the patient's history were reviewed and updated as appropriate: allergies, current medications and problem list.    Patient Active Problem List   Diagnosis     Fatty Liver     Obesity     Serology Prostate-specific Antigen (PSA) Elevated     Dysplastic Nevus     Vitamin D Deficiency     Essential Hypertension     Type 2 diabetes mellitus (H)       Current Outpatient Medications   Medication Sig     aspirin 81 MG EC tablet Take 81 mg by mouth daily.     blood glucose test (ACCU-CHEK CHRISS PLUS TEST STRP) strips USE ONE STRIP 3 TIMES DAILY     co-enzyme Q-10 30 mg capsule Take 30 mg by mouth 3 (three) times a day.     cyclobenzaprine (FLEXERIL) 5 MG tablet  "Take 1 tablet (5 mg total) by mouth at bedtime as needed for muscle spasms.     lancets (ULTRA THIN LANCETS) 30 gauge Misc Check blood sugar 3 times daily     lisinopril-hydrochlorothiazide (ZESTORETIC) 10-12.5 mg per tablet Take 1 tablet by mouth daily.     metFORMIN (GLUCOPHAGE-XR) 500 MG 24 hr tablet TAKE 2 TABLETS BY MOUTH ONCE DAILY WITH  SUPPER     pravastatin (PRAVACHOL) 10 MG tablet TAKE ONE TABLET BY MOUTH AT BEDTIME       Review of Systems   A 12 point comprehensive review of systems was negative except as noted.      Objective:      /78   Pulse 88   Resp 16   Ht 5' 7.5\" (1.715 m)   Wt (!) 232 lb (105.2 kg)   BMI 35.80 kg/m      General appearance: alert, appears stated age and cooperative  Head: Normocephalic, without obvious abnormality, atraumatic  Neck: no adenopathy, no carotid bruit, no JVD, supple, symmetrical, trachea midline and thyroid not enlarged, symmetric, no tenderness/mass/nodules  Lungs: clear to auscultation bilaterally  Heart: regular rate and rhythm, S1, S2 normal, no murmur, click, rub or gallop  Abdomen: Umbilical hernia present, obese.   Extremities: extremities normal, atraumatic, no cyanosis or edema  Skin: Skin color, texture, turgor normal. No rashes or lesions  Neurologic: Grossly normal, monofilament exam wnl      Results for orders placed or performed in visit on 04/30/19   Glycosylated Hemoglobin A1c   Result Value Ref Range    Hemoglobin A1c 7.3 (H) 3.5 - 6.0 %       Lorene Rene CNP  10:14 AM  "

## 2021-05-28 NOTE — TELEPHONE ENCOUNTER
Left message to call back for: Keyshawn    Information to relay to patient:  LMTCB    Please give patient the number to [facility] to schedule his annual diabetes eye exam.    [phone]    Tran Peñaloza, CMA

## 2021-05-28 NOTE — TELEPHONE ENCOUNTER
Pt is scheduled to see Lorene Rene on Tuesday 4/30/19 at 10:00am for DM follow up - please remind patient he still needs to schedule AWV.

## 2021-05-28 NOTE — TELEPHONE ENCOUNTER
Patient needs to schedule diabetes eye exam.  I spoke with him but he was unable to write the message down because he was driving at the time.  Please give him a call and give him the number to [facility] eye clinic so he can schedule that appointment.  [phone]  Thank you,  Debo Huddleston

## 2021-05-30 VITALS — HEIGHT: 68 IN | WEIGHT: 234 LBS | BODY MASS INDEX: 35.46 KG/M2

## 2021-05-30 NOTE — TELEPHONE ENCOUNTER
Refill Approved    Rx renewed per Medication Renewal Policy. Medication was last renewed on 3/5/19.    Cammy Rosales, [facility] Triage/Med Refill 7/25/2019     Requested Prescriptions   Pending Prescriptions Disp Refills     pravastatin (PRAVACHOL) 10 MG tablet [Pharmacy Med Name: PRAVASTATIN 10MG    TAB] 90 tablet 1     Sig: TAKE 1 TABLET BY MOUTH AT BEDTIME       Statins Refill Protocol (Hmg CoA Reductase Inhibitors) Passed - 7/25/2019 10:42 AM        Passed - PCP or prescribing provider visit in past 12 months      Last office visit with prescriber/PCP: 4/30/2019 Lorene Rene CNP OR same dept: 4/30/2019 Lorene Rene CNP OR same specialty: 4/30/2019 Lorene Rene CNP  Last physical: 4/25/2017 Last MTM visit: Visit date not found   Next visit within 3 mo: Visit date not found  Next physical within 3 mo: Visit date not found  Prescriber OR PCP: Lorene Rene CNP  Last diagnosis associated with med order: 1. Hyperlipemia  - pravastatin (PRAVACHOL) 10 MG tablet [Pharmacy Med Name: PRAVASTATIN 10MG    TAB]; TAKE 1 TABLET BY MOUTH AT BEDTIME  Dispense: 90 tablet; Refill: 1    2. Essential hypertension  - lisinopril-hydrochlorothiazide (PRINZIDE,ZESTORETIC) 10-12.5 mg per tablet [Pharmacy Med Name: LISINOPRIL/HCTZ 10-12.5MG TAB]; TAKE 1 TABLET BY MOUTH ONCE DAILY  Dispense: 90 tablet; Refill: 1    3. Diabetes (H)  - ACCU-CHEK SOFTCLIX LANCETS lancets [Pharmacy Med Name: SOFTCLIX LANCETS    MIS]; USE   TO CHECK GLUCOSE THREE TIMES DAILY DX  E11.9  Dispense: 200 each; Refill: 3    If protocol passes may refill for 12 months if within 3 months of last provider visit (or a total of 15 months).             lisinopril-hydrochlorothiazide (PRINZIDE,ZESTORETIC) 10-12.5 mg per tablet [Pharmacy Med Name: LISINOPRIL/HCTZ 10-12.5MG TAB] 90 tablet 1     Sig: TAKE 1 TABLET BY MOUTH ONCE DAILY       Diuretics/Combination Diuretics Refill Protocol  Passed - 7/25/2019 10:42 AM        Passed - Visit with  PCP or prescribing provider visit in past 12 months     Last office visit with prescriber/PCP: 4/30/2019 Lorene Rene CNP OR same dept: 4/30/2019 Lorene Rene CNP OR same specialty: 4/30/2019 Lorene Rene CNP  Last physical: 4/25/2017 Last MTM visit: Visit date not found   Next visit within 3 mo: Visit date not found  Next physical within 3 mo: Visit date not found  Prescriber OR PCP: Lorene Rene CNP  Last diagnosis associated with med order: 1. Hyperlipemia  - pravastatin (PRAVACHOL) 10 MG tablet [Pharmacy Med Name: PRAVASTATIN 10MG    TAB]; TAKE 1 TABLET BY MOUTH AT BEDTIME  Dispense: 90 tablet; Refill: 1    2. Essential hypertension  - lisinopril-hydrochlorothiazide (PRINZIDE,ZESTORETIC) 10-12.5 mg per tablet [Pharmacy Med Name: LISINOPRIL/HCTZ 10-12.5MG TAB]; TAKE 1 TABLET BY MOUTH ONCE DAILY  Dispense: 90 tablet; Refill: 1    3. Diabetes (H)  - ACCU-CHEK SOFTCLIX LANCETS lancets [Pharmacy Med Name: SOFTCLIX LANCETS    MIS]; USE   TO CHECK GLUCOSE THREE TIMES DAILY DX  E11.9  Dispense: 200 each; Refill: 3    If protocol passes may refill for 12 months if within 3 months of last provider visit (or a total of 15 months).             Passed - Serum Potassium in past 12 months      Lab Results   Component Value Date    Potassium 4.7 04/30/2019             Passed - Serum Sodium in past 12 months      Lab Results   Component Value Date    Sodium 136 04/30/2019             Passed - Blood pressure on file in past 12 months     BP Readings from Last 1 Encounters:   04/30/19 128/78             Passed - Serum Creatinine in past 12 months      Creatinine   Date Value Ref Range Status   04/30/2019 1.51 (H) 0.70 - 1.30 mg/dL Final             generic lancets (ACCU-CHEK SOFTCLIX LANCETS) [Pharmacy Med Name: SOFTCLIX LANCETS    MIS] 200 each 3     Sig: USE   TO CHECK GLUCOSE THREE TIMES DAILY DX  E11.9       Diabetic Supplies Refill Protocol Passed - 7/25/2019 10:42 AM        Passed - Visit with  PCP or prescribing provider visit in last 6 months     Last office visit with prescriber/PCP: 4/30/2019 Lorene Rene CNP OR same dept: 4/30/2019 Lorene Rene CNP OR same specialty: 4/30/2019 Lorene Rene CNP  Last physical: 4/25/2017 Last MTM visit: Visit date not found   Next visit within 3 mo: Visit date not found  Next physical within 3 mo: Visit date not found  Prescriber OR PCP: Lorene Rene CNP  Last diagnosis associated with med order: 1. Hyperlipemia  - pravastatin (PRAVACHOL) 10 MG tablet [Pharmacy Med Name: PRAVASTATIN 10MG    TAB]; TAKE 1 TABLET BY MOUTH AT BEDTIME  Dispense: 90 tablet; Refill: 1    2. Essential hypertension  - lisinopril-hydrochlorothiazide (PRINZIDE,ZESTORETIC) 10-12.5 mg per tablet [Pharmacy Med Name: LISINOPRIL/HCTZ 10-12.5MG TAB]; TAKE 1 TABLET BY MOUTH ONCE DAILY  Dispense: 90 tablet; Refill: 1    3. Diabetes (H)  - ACCU-CHEK SOFTCLIX LANCETS lancets [Pharmacy Med Name: SOFTCLIX LANCETS    MIS]; USE   TO CHECK GLUCOSE THREE TIMES DAILY DX  E11.9  Dispense: 200 each; Refill: 3    If protocol passes may refill for 12 months if within 3 months of last provider visit (or a total of 15 months).             Passed - A1C in last 6 months     Hemoglobin A1c   Date Value Ref Range Status   04/30/2019 7.3 (H) 3.5 - 6.0 % Final

## 2021-05-31 VITALS — HEIGHT: 68 IN | WEIGHT: 235 LBS | BODY MASS INDEX: 35.61 KG/M2

## 2021-06-01 VITALS — BODY MASS INDEX: 34.73 KG/M2 | WEIGHT: 229.13 LBS | HEIGHT: 68 IN

## 2021-06-02 VITALS — WEIGHT: 232 LBS | HEIGHT: 68 IN | BODY MASS INDEX: 35.16 KG/M2

## 2021-06-02 NOTE — TELEPHONE ENCOUNTER
Refill Approved    Rx renewed per Medication Renewal Policy. Medication was last renewed on 12/5/2018.  Last OV 4/30/2019.    Darcie Jc, [facility] Triage/Med Refill 10/8/2019     Requested Prescriptions   Pending Prescriptions Disp Refills     metFORMIN (GLUCOPHAGE-XR) 500 MG 24 hr tablet [Pharmacy Med Name: MetFORMIN ER 500MG  TAB] 180 tablet 3     Sig: TAKE 2 TABLETS BY MOUTH ONCE DAILY WITH  SUPPER       Metformin Refill Protocol Passed - 10/7/2019  9:23 AM        Passed - Blood pressure in last 12 months     BP Readings from Last 1 Encounters:   04/30/19 128/78             Passed - LFT or AST or ALT in last 12 months     Albumin   Date Value Ref Range Status   04/30/2019 4.3 3.5 - 5.0 g/dL Final     Bilirubin, Total   Date Value Ref Range Status   04/30/2019 0.7 0.0 - 1.0 mg/dL Final     Bilirubin, Direct   Date Value Ref Range Status   12/19/2011 0.2 <0.6 mg/dL Final     Alkaline Phosphatase   Date Value Ref Range Status   04/30/2019 69 45 - 120 U/L Final     AST   Date Value Ref Range Status   04/30/2019 33 0 - 40 U/L Final     ALT   Date Value Ref Range Status   04/30/2019 41 0 - 45 U/L Final     Protein, Total   Date Value Ref Range Status   04/30/2019 7.6 6.0 - 8.0 g/dL Final                Passed - GFR or Serum Creatinine in last 6 months     GFR MDRD Non Af Amer   Date Value Ref Range Status   04/30/2019 46 (L) >60 mL/min/1.73m2 Final     GFR MDRD Af Amer   Date Value Ref Range Status   04/30/2019 55 (L) >60 mL/min/1.73m2 Final             Passed - Visit with PCP or prescribing provider visit in last 6 months or next 3 months     Last office visit with prescriber/PCP: 4/30/2019 OR same dept: 4/30/2019 Lorene Rene CNP OR same specialty: 4/30/2019 Lorene Rene CNP Last physical: Visit date not found Last MTM visit: Visit date not found         Next appt within 3 mo: Visit date not found  Next physical within 3 mo: Visit date not found  Prescriber OR PCP: Lorene Rene  CNP  Last diagnosis associated with med order: 1. Diabetes (H)  - metFORMIN (GLUCOPHAGE-XR) 500 MG 24 hr tablet [Pharmacy Med Name: MetFORMIN ER 500MG  TAB]; TAKE 2 TABLETS BY MOUTH ONCE DAILY WITH  SUPPER  Dispense: 180 tablet; Refill: 3     If protocol passes may refill for 12 months if within 3 months of last provider visit (or a total of 15 months).           Passed - A1C in last 6 months     Hemoglobin A1c   Date Value Ref Range Status   04/30/2019 7.3 (H) 3.5 - 6.0 % Final               Passed - Microalbumin in last year      Microalbumin, Random Urine   Date Value Ref Range Status   04/30/2019 5.85 (H) 0.00 - 1.99 mg/dL Final

## 2021-06-06 NOTE — TELEPHONE ENCOUNTER
Pt over due for AWV, A1C, next round of labs due 04/30/2020.    Requesting refills     Left a message on pt VM asking him to call back and set up either his AWV anytime as he is overdue for that and he can schedule his DM check anytime before 4/30/2020 as he is due for that FU by the end of April.    Pt was told he was overdue for AWV last April when he did his DM check (still overdue)

## 2021-06-06 NOTE — TELEPHONE ENCOUNTER
Received a fax from [employer] requesting refill Latanoprost 0.005% sol    Patient last seen 04/30/19    Please review and advise.  Thank you so much!  Tran Peñaloza, CMA

## 2021-06-07 NOTE — TELEPHONE ENCOUNTER
Medication Question or Clarification  Who is calling: pharmacy    What medication are you calling about (include dose and sig)?:   latanoprost (XALATAN) 0.005 % ophthalmic solution  2.5 mL  0  3/17/2020   No    Sig - Route: Administer 0.1 drops to both eyes at bedtime. - Both Eyes        Who prescribed the medication?: PCP    What is your question/concern?: needs sig clarified.  Pharmacy question if dose should be 1 drop, NOT 0.1 drop.    Requested Pharmacy: [facility] [address] [place] [address]      Okay to leave a detailed message?: Yes    Please call pharmacy will dosing correction.

## 2021-06-09 NOTE — TELEPHONE ENCOUNTER
Requested Prescriptions     Pending Prescriptions Disp Refills     latanoprost (XALATAN) 0.005 % ophthalmic solution [Pharmacy Med Name: Latanoprost 0.005 % Ophthalmic Solution] 3 mL 0     Sig: INSTILL 1 DROP INTO EACH EYE AT BEDTIME     lisinopriL-hydrochlorothiazide (PRINZIDE,ZESTORETIC) 10-12.5 mg per tablet 30 tablet 0     Sig: Take 1 tablet by mouth daily. Needs appointment before additional refills.     metFORMIN (GLUCOPHAGE-XR) 500 MG 24 hr tablet 60 tablet 0     Sig: Take 2 tablets (1,000 mg total) by mouth daily with supper. Needs appointment before additional refills.     pravastatin (PRAVACHOL) 10 MG tablet 30 tablet 0     Sig: TAKE 1 TABLET BY MOUTH AT BEDTIME. Needs appointment before additional refills.     Last VV 07/06/2020.

## 2021-06-09 NOTE — PROGRESS NOTES
"Keyshawn Potter is a 74 y.o. male who is being evaluated via a billable telephone visit.      The patient has been notified of following:     \"This telephone visit will be conducted via a call between you and your physician/provider. We have found that certain health care needs can be provided without the need for a physical exam.  This service lets us provide the care you need with a short phone conversation.  If a prescription is necessary we can send it directly to your pharmacy.  If lab work is needed we can place an order for that and you can then stop by our lab to have the test done at a later time.    Telephone visits are billed at different rates depending on your insurance coverage. During this emergency period, for some insurers they may be billed the same as an in-person visit.  Please reach out to your insurance provider with any questions.    If during the course of the call the physician/provider feels a telephone visit is not appropriate, you will not be charged for this service.\"    Patient has given verbal consent to a Telephone visit? Yes    What phone number would you like to be contacted at? [phone]    Patient would like to receive their AVS by AVS Preference: Mail a copy.    Additional provider notes: 74-year-old male, new to me, here today for telephone visit for diabetes check.  He has not seen his PCP in over 1 year.  He is currently taking metformin 1000 mg daily.  Denies any adverse effects of medication such as myalgias.    Hypertension: Currently taking lisinopril-hydrochlorothiazide 10-12.5 mg daily.  No adverse effects of medication such as cough.  No headache, chest pain, vision changes.    He is also currently taking pravastatin 10 mg daily.  No adverse effects of medication such as myalgias.    Assessment/Plan:  1. Type 2 diabetes mellitus without complication, without long-term current use of insulin (H)  Patient is due for hemoglobin A1c check, ordered today.  Once we have " lab tests can refill medications.    - Glycosylated Hemoglobin A1c; Future    2. Morbid obesity (H)  Congratulated patient on 10 pound weight loss, though given his diabetes and hypertension, recommended that patient should continue to work on weight loss to help other comorbidities.    3. Essential hypertension  Unable to check blood pressure at home, though we will check labs today.  - Electrolyte Profile; Future  - Creatinine; Future    4. Hyperlipidemia, unspecified hyperlipidemia type  Fasting lipid labs also ordered today.    5. Screening for viral disease  Discussed one-time hepatitis C screening test, ordered today.  - Hepatitis C Antibody (Anti-HCV); Future    Counseled patient regarding immunizations, including TD and pneumonia 13.  Patient is in agreement to having these, will assist him in scheduling nurse visit.    Phone call duration:  7 minutes  Start time: 8:49am  End Time: 8:56am  Tran Peñaloza CMA

## 2021-06-09 NOTE — TELEPHONE ENCOUNTER
----- Message from Subha Houston MD sent at 7/6/2020  9:24 AM CDT -----  Please assist patient in scheduling fasting lab and nurse (vaccine) appointment.

## 2021-06-09 NOTE — TELEPHONE ENCOUNTER
RN cannot approve Refill Request    RN can NOT refill this medication Protocol failed and NO refill given.       Cammy Rosales, [facility] Triage/Med Refill 6/18/2020    Requested Prescriptions   Pending Prescriptions Disp Refills     metFORMIN (GLUCOPHAGE-XR) 500 MG 24 hr tablet [Pharmacy Med Name: metFORMIN HCl  MG Oral Tablet Extended Release 24 Hour] 180 tablet 3     Sig: TAKE 2 TABLETS BY MOUTH ONCE DAILY WITH SUPPER       Metformin Refill Protocol Failed - 6/18/2020  1:16 PM        Failed - Blood pressure in last 12 months     BP Readings from Last 1 Encounters:   04/30/19 128/78             Failed - LFT or AST or ALT in last 12 months     Albumin   Date Value Ref Range Status   04/30/2019 4.3 3.5 - 5.0 g/dL Final     Bilirubin, Total   Date Value Ref Range Status   04/30/2019 0.7 0.0 - 1.0 mg/dL Final     Bilirubin, Direct   Date Value Ref Range Status   12/19/2011 0.2 <0.6 mg/dL Final     Alkaline Phosphatase   Date Value Ref Range Status   04/30/2019 69 45 - 120 U/L Final     AST   Date Value Ref Range Status   04/30/2019 33 0 - 40 U/L Final     ALT   Date Value Ref Range Status   04/30/2019 41 0 - 45 U/L Final     Protein, Total   Date Value Ref Range Status   04/30/2019 7.6 6.0 - 8.0 g/dL Final                Failed - GFR or Serum Creatinine in last 6 months     GFR MDRD Non Af Amer   Date Value Ref Range Status   04/30/2019 46 (L) >60 mL/min/1.73m2 Final     GFR MDRD Af Amer   Date Value Ref Range Status   04/30/2019 55 (L) >60 mL/min/1.73m2 Final             Failed - Visit with PCP or prescribing provider visit in last 6 months or next 3 months     Last office visit with prescriber/PCP: Visit date not found OR same dept: Visit date not found OR same specialty: 4/30/2019 Lorene Rene CNP Last physical: Visit date not found Last MTM visit: Visit date not found         Next appt within 3 mo: Visit date not found  Next physical within 3 mo: Visit date not found  Prescriber OR PCP: Lorene BURNS  Forslund, CNP  Last diagnosis associated with med order: 1. Diabetes (H)  - metFORMIN (GLUCOPHAGE-XR) 500 MG 24 hr tablet [Pharmacy Med Name: metFORMIN HCl  MG Oral Tablet Extended Release 24 Hour]; TAKE 2 TABLETS BY MOUTH ONCE DAILY WITH  SUPPER  Dispense: 180 tablet; Refill: 0    2. Essential hypertension  - lisinopriL-hydrochlorothiazide (PRINZIDE,ZESTORETIC) 10-12.5 mg per tablet [Pharmacy Med Name: Lisinopril-hydroCHLOROthiazide 10-12.5 MG Oral Tablet]; Take 1 tablet by mouth once daily  Dispense: 90 tablet; Refill: 0    3. Hyperlipemia  - pravastatin (PRAVACHOL) 10 MG tablet [Pharmacy Med Name: Pravastatin Sodium 10 MG Oral Tablet]; TAKE 1 TABLET BY MOUTH AT BEDTIME  Dispense: 90 tablet; Refill: 0    4. Dry eyes  - latanoprost (XALATAN) 0.005 % ophthalmic solution [Pharmacy Med Name: Latanoprost 0.005 % Ophthalmic Solution]; INSTILL 1 DROP INTO EACH EYE AT BEDTIME  Dispense: 3 mL; Refill: 0     If protocol passes may refill for 12 months if within 3 months of last provider visit (or a total of 15 months).           Failed - A1C in last 6 months     Hemoglobin A1c   Date Value Ref Range Status   04/30/2019 7.3 (H) 3.5 - 6.0 % Final               Failed - Microalbumin in last year      Microalbumin, Random Urine   Date Value Ref Range Status   04/30/2019 5.85 (H) 0.00 - 1.99 mg/dL Final                     lisinopriL-hydrochlorothiazide (PRINZIDE,ZESTORETIC) 10-12.5 mg per tablet [Pharmacy Med Name: Lisinopril-hydroCHLOROthiazide 10-12.5 MG Oral Tablet] 90 tablet 3     Sig: Take 1 tablet by mouth once daily       Diuretics/Combination Diuretics Refill Protocol  Failed - 6/18/2020  1:16 PM        Failed - Visit with PCP or prescribing provider visit in past 12 months     Last office visit with prescriber/PCP: 4/30/2019 Lorene Rene CNP OR same dept: Visit date not found OR same specialty: 4/30/2019 Lorene Rene CNP  Last physical: 4/25/2017 Last MTM visit: Visit date not found   Next visit  within 3 mo: Visit date not found  Next physical within 3 mo: Visit date not found  Prescriber OR PCP: Lorene Rene CNP  Last diagnosis associated with med order: 1. Diabetes (H)  - metFORMIN (GLUCOPHAGE-XR) 500 MG 24 hr tablet [Pharmacy Med Name: metFORMIN HCl  MG Oral Tablet Extended Release 24 Hour]; TAKE 2 TABLETS BY MOUTH ONCE DAILY WITH  SUPPER  Dispense: 180 tablet; Refill: 0    2. Essential hypertension  - lisinopriL-hydrochlorothiazide (PRINZIDE,ZESTORETIC) 10-12.5 mg per tablet [Pharmacy Med Name: Lisinopril-hydroCHLOROthiazide 10-12.5 MG Oral Tablet]; Take 1 tablet by mouth once daily  Dispense: 90 tablet; Refill: 0    3. Hyperlipemia  - pravastatin (PRAVACHOL) 10 MG tablet [Pharmacy Med Name: Pravastatin Sodium 10 MG Oral Tablet]; TAKE 1 TABLET BY MOUTH AT BEDTIME  Dispense: 90 tablet; Refill: 0    4. Dry eyes  - latanoprost (XALATAN) 0.005 % ophthalmic solution [Pharmacy Med Name: Latanoprost 0.005 % Ophthalmic Solution]; INSTILL 1 DROP INTO EACH EYE AT BEDTIME  Dispense: 3 mL; Refill: 0    If protocol passes may refill for 12 months if within 3 months of last provider visit (or a total of 15 months).             Failed - Serum Potassium in past 12 months      No results found for: LN-POTASSIUM          Failed - Serum Sodium in past 12 months      No results found for: LN-SODIUM          Failed - Blood pressure on file in past 12 months     BP Readings from Last 1 Encounters:   04/30/19 128/78             Failed - Serum Creatinine in past 12 months      Creatinine   Date Value Ref Range Status   04/30/2019 1.51 (H) 0.70 - 1.30 mg/dL Final                pravastatin (PRAVACHOL) 10 MG tablet [Pharmacy Med Name: Pravastatin Sodium 10 MG Oral Tablet] 90 tablet 3     Sig: TAKE 1 TABLET BY MOUTH AT BEDTIME       Statins Refill Protocol (Hmg CoA Reductase Inhibitors) Failed - 6/18/2020  1:16 PM        Failed - PCP or prescribing provider visit in past 12 months      Last office visit with  prescriber/PCP: 4/30/2019 Lorene Rene CNP OR same dept: Visit date not found OR same specialty: 4/30/2019 Lorene Rene CNP  Last physical: 4/25/2017 Last MTM visit: Visit date not found   Next visit within 3 mo: Visit date not found  Next physical within 3 mo: Visit date not found  Prescriber OR PCP: Lorene Rene CNP  Last diagnosis associated with med order: 1. Diabetes (H)  - metFORMIN (GLUCOPHAGE-XR) 500 MG 24 hr tablet [Pharmacy Med Name: metFORMIN HCl  MG Oral Tablet Extended Release 24 Hour]; TAKE 2 TABLETS BY MOUTH ONCE DAILY WITH  SUPPER  Dispense: 180 tablet; Refill: 0    2. Essential hypertension  - lisinopriL-hydrochlorothiazide (PRINZIDE,ZESTORETIC) 10-12.5 mg per tablet [Pharmacy Med Name: Lisinopril-hydroCHLOROthiazide 10-12.5 MG Oral Tablet]; Take 1 tablet by mouth once daily  Dispense: 90 tablet; Refill: 0    3. Hyperlipemia  - pravastatin (PRAVACHOL) 10 MG tablet [Pharmacy Med Name: Pravastatin Sodium 10 MG Oral Tablet]; TAKE 1 TABLET BY MOUTH AT BEDTIME  Dispense: 90 tablet; Refill: 0    4. Dry eyes  - latanoprost (XALATAN) 0.005 % ophthalmic solution [Pharmacy Med Name: Latanoprost 0.005 % Ophthalmic Solution]; INSTILL 1 DROP INTO EACH EYE AT BEDTIME  Dispense: 3 mL; Refill: 0    If protocol passes may refill for 12 months if within 3 months of last provider visit (or a total of 15 months).                latanoprost (XALATAN) 0.005 % ophthalmic solution [Pharmacy Med Name: Latanoprost 0.005 % Ophthalmic Solution] 3 mL 9     Sig: INSTILL 1 DROP INTO EACH EYE AT BEDTIME       There is no refill protocol information for this order

## 2021-06-09 NOTE — TELEPHONE ENCOUNTER
Pt hasn't been seen since 4/2019. 1 month rx signed but Needs appointment before additional refills.  Please assist patient in scheduling VV appointment.

## 2021-06-10 NOTE — TELEPHONE ENCOUNTER
RN cannot approve Refill Request    RN can NOT refill this medication Protocol failed and NO refill given. Last office visit: 4/30/2019 Lorene Rene CNP Last Physical: 4/25/2017 Last MTM visit: Visit date not found Last visit same specialty: 4/30/2019 Lorene Rene CNP.  Next visit within 3 mo: Visit date not found  Next physical within 3 mo: Visit date not found      Sarahi Mcdonough, [facility] Triage/Med Refill 8/22/2020    Requested Prescriptions   Pending Prescriptions Disp Refills     latanoprost (XALATAN) 0.005 % ophthalmic solution [Pharmacy Med Name: Latanoprost 0.005 % Ophthalmic Solution] 3 mL 0     Sig: INSTILL 1 DROP INTO EACH EYE AT BEDTIME       There is no refill protocol information for this order

## 2021-06-10 NOTE — PROGRESS NOTES
Assessment/ Plan:      1. Well adult exam  Healthy male exam completed today.  Labs are updated as below.  He has been doing well over the last 6 months and has no specific concerns.  He was encouraged to continue with his healthy lifestyle modifications.  I did encourage him to participate in regular exercise as well as eating a balanced diet.  I encouraged him to follow-up in 6 months or sooner as needed.  - Lipid Cascade  - HM2(CBC w/o Differential)  - Basic Metabolic Panel  - PSA, Annual Screen (Prostatic-Specific Antigen)    2. Diabetes  Patient has been controlling his diabetes well in the past.  I did encourage him to monitor his blood sugar more often than once weekly.  I provided him with refills of his test strips as well as lancets.  Basic metabolic panel will be checked today as well as a hemoglobin A1c.  Patient to follow-up in 6 months for diabetes check.  - blood glucose test strips; Use 1 each As Directed daily.  Dispense: 50 strip; Refill: 0  - Basic Metabolic Panel  - Glycosylated Hemoglobin A1c    3. Essential hypertension  Patient is doing well with his blood pressure control.  He is on a low-dose lisinopril with hydrochlorothiazide.  He likely does not require this medication however it is giving him kidney protection.  His blood pressure today is stable.  Blood work checked today and medication refill given.  Patient to follow-up in 1 year for his blood pressure.  - lisinopril-hydrochlorothiazide (ZESTORETIC) 10-12.5 mg per tablet; Take 1 tablet by mouth daily.  Dispense: 90 tablet; Refill: 3  - Basic Metabolic Panel    4. Hyperlipemia  Patient has a history of hyperlipidemia as well as diabetes.  He is doing well on his current dose of anticholesterol medication.  He will continue on this dose.  Blood work will be checked today.  - pravastatin (PRAVACHOL) 10 MG tablet; TAKE ONE TABLET BY MOUTH AT BEDTIME  Dispense: 90 tablet; Refill: 3  - Basic Metabolic Panel  - Glycosylated Hemoglobin  A1c    5. Obesity (BMI 35.0-39.9 without comorbidity)  Patient is considered obese with a BMI of 35.58.  I encouraged him to participate in healthy lifestyle by eating a balanced diet getting regular exercise.  I also discussed decreasing the intake of soda as this does lead to inability to lose weight.  The following high BMI interventions were performed this visit: encouragement to exercise, dietary management education, guidance, and counseling and lifestyle education regarding diet      Subjective:      Keyshawn Potter is a 71 y.o. male who presents for an annual exam. The patient reports that there is not domestic violence in his life. Medication check for hypertension and diabetes. He reports that he checks his blood sugar once weekly.  He reports that his goal blood sugar is less than 200.  He admits that he should be checking his blood sugar more often.  Otherwise feels he is doing well.  He denies any concerns regarding his medications at this time.  He would like some medication refills for his diabetes, hypertension, and cholesterol.  He takes an aspirin on a daily basis as well.  He does admit that he should cut back on his soda consumption.  He reports that he will drink 10-12 cans of diet soda daily.    Healthy Habits:   Regular Exercise: Yes  Sunscreen Use: No  Healthy Diet: No  Dental Visits Regularly: No  Seat Belt: No  Sexually active: No  Monthly Self Testicular Exams:  No  Hemoccults: No  Flex Sig: N/A  Colonoscopy: N/A  Lipid Profile: Yes  Glucose Screen: Yes  Prevention of Osteoporosis: N/A  Last Dexa: N/A  Guns at Home:  Yes  Guns Safety Locks:  No      Immunization History   Administered Date(s) Administered     Influenza, inj, historic 10/25/2010     Pneumo Polysac 23-V 08/12/2009     Td, historic 03/07/2008     Tdap 03/07/2008     ZOSTER 08/12/2009     Immunization status: up to date and documented.    No exam data present    Current Outpatient Prescriptions   Medication Sig Dispense Refill  "    aspirin 81 MG EC tablet Take 81 mg by mouth daily.       blood sugar diagnostic Strp Use 1 strip As Directed daily. 100 strip 3     co-enzyme Q-10 30 mg capsule Take 30 mg by mouth 3 (three) times a day.       LANCETS MISC Use As Directed.       lisinopril-hydrochlorothiazide (PRINZIDE,ZESTORETIC) 10-12.5 mg per tablet TAKE ONE TABLET BY MOUTH ONCE DAILY 30 tablet 0     lisinopril-hydrochlorothiazide (ZESTORETIC) 10-12.5 mg per tablet Take 1 tablet by mouth daily. 90 tablet 3     pravastatin (PRAVACHOL) 10 MG tablet TAKE ONE TABLET BY MOUTH AT BEDTIME 90 tablet 3     UNKNOWN TO PATIENT        No current facility-administered medications for this visit.      Past Medical History:   Diagnosis Date     Diabetes mellitus      Hyperlipidemia      Hypertension      History reviewed. No pertinent surgical history.  Review of patient's allergies indicates no known allergies.  History reviewed. No pertinent family history.  Social History     Social History     Marital status: Single     Spouse name: N/A     Number of children: N/A     Years of education: N/A     Occupational History     Not on file.     Social History Main Topics     Smoking status: Never Smoker     Smokeless tobacco: Never Used     Alcohol use No     Drug use: No     Sexual activity: Not on file     Other Topics Concern     Not on file     Social History Narrative       Review of Systems  General:  Denies problem  Eyes: Denies problem  Ears/Nose/Throat: Denies problem  Cardiovascular: Denies problem  Respiratory:  Denies problem  Gastrointestinal:  Denies problem  Genitourinary: Denies problem  Musculoskeletal:  Denies problem  Skin: Denies problem  Neurologic: Denies problem  Psychiatric: Denies problem  Endocrine: Denies problem  Heme/Lymphatic: Denies problem   Allergic/Immunologic: Denies problem        Objective:     Vitals:    04/25/17 1015   BP: 118/80   Pulse: 88   Resp: 16   Weight: (!) 234 lb (106.1 kg)   Height: 5' 8\" (1.727 m)     Body mass " index is 35.58 kg/(m^2).    Physical  General Appearance: Alert, cooperative, no distress, appears stated age  Head: Normocephalic, without obvious abnormality, atraumatic  Eyes: PERRL, conjunctiva/corneas clear, EOM's intact  Ears: Normal TM's and external ear canals, both ears  Nose: Nares normal, septum midline,mucosa normal, no drainage  Throat: Lips, mucosa, and tongue normal; teeth and gums normal  Neck: Supple, symmetrical, trachea midline, no adenopathy;  thyroid: not enlarged, symmetric, no tenderness/mass/nodules  Back: Symmetric, no curvature, ROM normal, no CVA tenderness  Lungs: Clear to auscultation bilaterally, respirations unlabored  Heart: Regular rate and rhythm, S1 and S2 normal, no murmur, rub, or gallop,  Abdomen: Soft, non-tender, bowel sounds active all four quadrants,  no masses, no organomegaly  Genitourinary: Penis normal. Right testis is descended. Left testis is descended.   Musculoskeletal: Normal range of motion. No joint swelling or deformity.   Extremities: Extremities normal, atraumatic, no cyanosis or edema  Skin: Skin color, texture, turgor normal, no rashes or lesions  Lymph nodes: Cervical, supraclavicular, and axillary nodes normal  Neurologic: He is alert. He has normal reflexes.   Psychiatric: He has a normal mood and affect.

## 2021-06-10 NOTE — TELEPHONE ENCOUNTER
RN cannot approve Refill Request    RN can NOT refill this medication PCP messaged that patient is overdue for Labs. Last office visit: Visit date not found Last Physical: Visit date not found Last MTM visit: Visit date not found Last visit same specialty: 4/30/2019 Lorene Rene CNP.  Next visit within 3 mo: Visit date not found  Next physical within 3 mo: Visit date not found      Sarahi Mcdonough, [facility] Triage/Med Refill 8/22/2020    Requested Prescriptions   Pending Prescriptions Disp Refills     pravastatin (PRAVACHOL) 10 MG tablet [Pharmacy Med Name: Pravastatin Sodium 10 MG Oral Tablet] 30 tablet 0     Sig: TAKE 1 TABLET BY MOUTH ONCE DAILY AT BEDTIME *NEED  APPOINTMENT  FOR  FURTHER  REFILLS*       Statins Refill Protocol (Hmg CoA Reductase Inhibitors) Passed - 8/20/2020  9:10 AM        Passed - PCP or prescribing provider visit in past 12 months      Last office visit with prescriber/PCP: Visit date not found OR same dept: Visit date not found OR same specialty: 4/30/2019 Lorene Rene CNP  Last physical: Visit date not found Last MTM visit: Visit date not found   Next visit within 3 mo: Visit date not found  Next physical within 3 mo: Visit date not found  Prescriber OR PCP: Subha Houston MD  Last diagnosis associated with med order: 1. Hyperlipemia  - pravastatin (PRAVACHOL) 10 MG tablet [Pharmacy Med Name: Pravastatin Sodium 10 MG Oral Tablet]; TAKE 1 TABLET BY MOUTH ONCE DAILY AT BEDTIME *NEED  APPOINTMENT  FOR  FURTHER  REFILLS*  Dispense: 30 tablet; Refill: 0    2. Essential hypertension  - lisinopriL-hydrochlorothiazide (PRINZIDE,ZESTORETIC) 10-12.5 mg per tablet [Pharmacy Med Name: Lisinopril-hydroCHLOROthiazide 10-12.5 MG Oral Tablet]; TAKE 1 TABLET BY MOUTH ONCE DAILY *NEED  APPOINTMENT  FOR  FURTHER  REFILLS*  Dispense: 30 tablet; Refill: 0    3. Diabetes (H)  - metFORMIN (GLUCOPHAGE-XR) 500 MG 24 hr tablet [Pharmacy Med Name: metFORMIN HCl  MG Oral Tablet  Extended Release 24 Hour]; TAKE 2 TABLETS BY MOUTH ONCE DAILY WITH  SUPPER  *NEED  APPOINTMENT  FOR  FURTHER  REFILLS*  Dispense: 60 tablet; Refill: 0    If protocol passes may refill for 12 months if within 3 months of last provider visit (or a total of 15 months).                lisinopriL-hydrochlorothiazide (PRINZIDE,ZESTORETIC) 10-12.5 mg per tablet [Pharmacy Med Name: Lisinopril-hydroCHLOROthiazide 10-12.5 MG Oral Tablet] 30 tablet 0     Sig: TAKE 1 TABLET BY MOUTH ONCE DAILY *NEED  APPOINTMENT  FOR  FURTHER  REFILLS*       Diuretics/Combination Diuretics Refill Protocol  Failed - 8/20/2020  9:10 AM        Failed - Blood pressure on file in past 12 months     BP Readings from Last 1 Encounters:   04/30/19 128/78             Passed - Visit with PCP or prescribing provider visit in past 12 months     Last office visit with prescriber/PCP: Visit date not found OR same dept: Visit date not found OR same specialty: 4/30/2019 Lorene Rene CNP  Last physical: Visit date not found Last MTM visit: Visit date not found   Next visit within 3 mo: Visit date not found  Next physical within 3 mo: Visit date not found  Prescriber OR PCP: Subha Houston MD  Last diagnosis associated with med order: 1. Hyperlipemia  - pravastatin (PRAVACHOL) 10 MG tablet [Pharmacy Med Name: Pravastatin Sodium 10 MG Oral Tablet]; TAKE 1 TABLET BY MOUTH ONCE DAILY AT BEDTIME *NEED  APPOINTMENT  FOR  FURTHER  REFILLS*  Dispense: 30 tablet; Refill: 0    2. Essential hypertension  - lisinopriL-hydrochlorothiazide (PRINZIDE,ZESTORETIC) 10-12.5 mg per tablet [Pharmacy Med Name: Lisinopril-hydroCHLOROthiazide 10-12.5 MG Oral Tablet]; TAKE 1 TABLET BY MOUTH ONCE DAILY *NEED  APPOINTMENT  FOR  FURTHER  REFILLS*  Dispense: 30 tablet; Refill: 0    3. Diabetes (H)  - metFORMIN (GLUCOPHAGE-XR) 500 MG 24 hr tablet [Pharmacy Med Name: metFORMIN HCl  MG Oral Tablet Extended Release 24 Hour]; TAKE 2 TABLETS BY MOUTH ONCE DAILY WITH  SUPPER   *NEED  APPOINTMENT  FOR  FURTHER  REFILLS*  Dispense: 60 tablet; Refill: 0    If protocol passes may refill for 12 months if within 3 months of last provider visit (or a total of 15 months).             Passed - Serum Potassium in past 12 months      Lab Results   Component Value Date    Potassium 4.2 07/09/2020             Passed - Serum Sodium in past 12 months      Lab Results   Component Value Date    Sodium 134 (L) 07/09/2020             Passed - Serum Creatinine in past 12 months      Creatinine   Date Value Ref Range Status   07/09/2020 1.58 (H) 0.70 - 1.30 mg/dL Final                metFORMIN (GLUCOPHAGE-XR) 500 MG 24 hr tablet [Pharmacy Med Name: metFORMIN HCl  MG Oral Tablet Extended Release 24 Hour] 60 tablet 0     Sig: TAKE 2 TABLETS BY MOUTH ONCE DAILY WITH  SUPPER  *NEED  APPOINTMENT  FOR  FURTHER  REFILLS*       Metformin Refill Protocol Failed - 8/20/2020  9:10 AM        Failed - Blood pressure in last 12 months     BP Readings from Last 1 Encounters:   04/30/19 128/78             Failed - LFT or AST or ALT in last 12 months     Albumin   Date Value Ref Range Status   04/30/2019 4.3 3.5 - 5.0 g/dL Final     Bilirubin, Total   Date Value Ref Range Status   04/30/2019 0.7 0.0 - 1.0 mg/dL Final     Bilirubin, Direct   Date Value Ref Range Status   12/19/2011 0.2 <0.6 mg/dL Final     Alkaline Phosphatase   Date Value Ref Range Status   04/30/2019 69 45 - 120 U/L Final     AST   Date Value Ref Range Status   04/30/2019 33 0 - 40 U/L Final     ALT   Date Value Ref Range Status   04/30/2019 41 0 - 45 U/L Final     Protein, Total   Date Value Ref Range Status   04/30/2019 7.6 6.0 - 8.0 g/dL Final                Failed - Visit with PCP or prescribing provider visit in last 6 months or next 3 months     Last office visit with prescriber/PCP: Visit date not found OR same dept: Visit date not found OR same specialty: 4/30/2019 Lorene Rene CNP Last physical: Visit date not found Last MTM visit:  Visit date not found         Next appt within 3 mo: Visit date not found  Next physical within 3 mo: Visit date not found  Prescriber OR PCP: Subha Houston MD  Last diagnosis associated with med order: 1. Hyperlipemia  - pravastatin (PRAVACHOL) 10 MG tablet [Pharmacy Med Name: Pravastatin Sodium 10 MG Oral Tablet]; TAKE 1 TABLET BY MOUTH ONCE DAILY AT BEDTIME *NEED  APPOINTMENT  FOR  FURTHER  REFILLS*  Dispense: 30 tablet; Refill: 0    2. Essential hypertension  - lisinopriL-hydrochlorothiazide (PRINZIDE,ZESTORETIC) 10-12.5 mg per tablet [Pharmacy Med Name: Lisinopril-hydroCHLOROthiazide 10-12.5 MG Oral Tablet]; TAKE 1 TABLET BY MOUTH ONCE DAILY *NEED  APPOINTMENT  FOR  FURTHER  REFILLS*  Dispense: 30 tablet; Refill: 0    3. Diabetes (H)  - metFORMIN (GLUCOPHAGE-XR) 500 MG 24 hr tablet [Pharmacy Med Name: metFORMIN HCl  MG Oral Tablet Extended Release 24 Hour]; TAKE 2 TABLETS BY MOUTH ONCE DAILY WITH  SUPPER  *NEED  APPOINTMENT  FOR  FURTHER  REFILLS*  Dispense: 60 tablet; Refill: 0     If protocol passes may refill for 12 months if within 3 months of last provider visit (or a total of 15 months).           Failed - Microalbumin in last year      Microalbumin, Random Urine   Date Value Ref Range Status   04/30/2019 5.85 (H) 0.00 - 1.99 mg/dL Final                  Passed - GFR or Serum Creatinine in last 6 months     GFR MDRD Non Af Amer   Date Value Ref Range Status   07/09/2020 43 (L) >60 mL/min/1.73m2 Final     GFR MDRD Af Amer   Date Value Ref Range Status   07/09/2020 52 (L) >60 mL/min/1.73m2 Final             Passed - A1C in last 6 months     Hemoglobin A1c   Date Value Ref Range Status   07/09/2020 6.1 (H) 3.5 - 6.0 % Final

## 2021-06-10 NOTE — TELEPHONE ENCOUNTER
Who is calling:  Patient   Reason for Call:  Caller is needing to know why he needs another visit when he already had one in July and got labs done for medications.   Date of last appointment with primary care:   Okay to leave a detailed message: Yes

## 2021-06-14 NOTE — TELEPHONE ENCOUNTER
RN cannot approve Refill Request    RN can NOT refill this medication med is not covered by policy/route to provider. Last office visit: Visit date not found Last Physical: Visit date not found Last MTM visit: Visit date not found Last visit same specialty: 4/30/2019 Lorene Rene, CNP.  Next visit within 3 mo: Visit date not found  Next physical within 3 mo: Visit date not found      Cammy Rosales, [facility] Triage/Med Refill 1/21/2021    Requested Prescriptions   Pending Prescriptions Disp Refills     latanoprost (XALATAN) 0.005 % ophthalmic solution [Pharmacy Med Name: Latanoprost 0.005 % Ophthalmic Solution] 3 mL 0     Sig: INSTILL 1 DROP INTO EACH EYE AT BEDTIME       There is no refill protocol information for this order            Well controlled on current medications, reviewed individually with patient.

## 2021-06-16 NOTE — TELEPHONE ENCOUNTER
2nd attempt: LVM for patient to see if these are for us to refill or if its for his eye care facility to fill these. Also informed him that the [facility] office closed and Anju is at [facility] now and gave the number for there, also provided the number for [facility] in  to get an [facility] care appt. With a new provider if he wants to go that route.

## 2021-06-16 NOTE — TELEPHONE ENCOUNTER
Third Attempt: I mailed a letter to the patient regarding the below and that patient should schedule an [facility] visit as the [facility] has closed. Closing until the patient responds.

## 2021-06-16 NOTE — TELEPHONE ENCOUNTER
1st attempt: LVM for patient to see if these are for us to refill or if its for his eye care facility to fill these. Also informed him that the [facility] office closed and Anju is at [facility] now and gave the number for there, also provided the number for [facility] in  to get an [facility] care appt. With a new provider if he wants to go that route.

## 2021-06-16 NOTE — TELEPHONE ENCOUNTER
RN cannot approve Refill Request    RN can NOT refill this medication PCP messaged that patient is overdue for Labs, Office Visit and Blood Pressure Check and Protocol failed and NO refill given. Last office visit: Visit date not found Last Physical: Visit date not found Last MTM visit: Visit date not found Last visit same specialty: 4/30/2019 Lorene Rene CNP.  Next visit within 3 mo: Visit date not found  Next physical within 3 mo: Visit date not found      Heather Louis, [facility] Triage/Med Refill 3/18/2021    Requested Prescriptions   Pending Prescriptions Disp Refills     lisinopriL-hydrochlorothiazide (PRINZIDE,ZESTORETIC) 10-12.5 mg per tablet [Pharmacy Med Name: Lisinopril-hydroCHLOROthiazide 10-12.5 MG Oral Tablet] 90 tablet 0     Sig: Take 1 tablet by mouth once daily       Diuretics/Combination Diuretics Refill Protocol  Failed - 3/16/2021  9:55 AM        Failed - Blood pressure on file in past 12 months     BP Readings from Last 1 Encounters:   04/30/19 128/78             Passed - Visit with PCP or prescribing provider visit in past 12 months     Last office visit with prescriber/PCP: Visit date not found OR same dept: Visit date not found OR same specialty: 4/30/2019 Lorene Rene CNP  Last physical: Visit date not found Last MTM visit: Visit date not found   Next visit within 3 mo: Visit date not found  Next physical within 3 mo: Visit date not found  Prescriber OR PCP: Radha Thomason MD  Last diagnosis associated with med order: 1. Essential hypertension  - lisinopriL-hydrochlorothiazide (PRINZIDE,ZESTORETIC) 10-12.5 mg per tablet [Pharmacy Med Name: Lisinopril-hydroCHLOROthiazide 10-12.5 MG Oral Tablet]; Take 1 tablet by mouth once daily  Dispense: 90 tablet; Refill: 0    2. Diabetes (H)  - metFORMIN (GLUCOPHAGE-XR) 500 MG 24 hr tablet [Pharmacy Med Name: metFORMIN HCl  MG Oral Tablet Extended Release 24 Hour]; TAKE 2 TABLETS BY MOUTH ONCE DAILY WITH SUPPER  Dispense:  180 tablet; Refill: 0    3. Hyperlipemia  - pravastatin (PRAVACHOL) 10 MG tablet [Pharmacy Med Name: Pravastatin Sodium 10 MG Oral Tablet]; TAKE 1 TABLET BY MOUTH AT BEDTIME.  Dispense: 90 tablet; Refill: 0    If protocol passes may refill for 12 months if within 3 months of last provider visit (or a total of 15 months).             Passed - Serum Potassium in past 12 months      Lab Results   Component Value Date    Potassium 4.2 07/09/2020             Passed - Serum Sodium in past 12 months      Lab Results   Component Value Date    Sodium 134 (L) 07/09/2020             Passed - Serum Creatinine in past 12 months      Creatinine   Date Value Ref Range Status   07/09/2020 1.58 (H) 0.70 - 1.30 mg/dL Final                metFORMIN (GLUCOPHAGE-XR) 500 MG 24 hr tablet [Pharmacy Med Name: metFORMIN HCl  MG Oral Tablet Extended Release 24 Hour] 180 tablet 0     Sig: TAKE 2 TABLETS BY MOUTH ONCE DAILY WITH SUPPER       Metformin Refill Protocol Failed - 3/16/2021  9:55 AM        Failed - Blood pressure in last 12 months     BP Readings from Last 1 Encounters:   04/30/19 128/78             Failed - LFT or AST or ALT in last 12 months     Albumin   Date Value Ref Range Status   04/30/2019 4.3 3.5 - 5.0 g/dL Final     Bilirubin, Total   Date Value Ref Range Status   04/30/2019 0.7 0.0 - 1.0 mg/dL Final     Bilirubin, Direct   Date Value Ref Range Status   12/19/2011 0.2 <0.6 mg/dL Final     Alkaline Phosphatase   Date Value Ref Range Status   04/30/2019 69 45 - 120 U/L Final     AST   Date Value Ref Range Status   04/30/2019 33 0 - 40 U/L Final     ALT   Date Value Ref Range Status   04/30/2019 41 0 - 45 U/L Final     Protein, Total   Date Value Ref Range Status   04/30/2019 7.6 6.0 - 8.0 g/dL Final                Failed - Visit with PCP or prescribing provider visit in last 6 months or next 3 months     Last office visit with prescriber/PCP: Visit date not found OR same dept: Visit date not found OR same specialty:  4/30/2019 Lorene Rene, CNP Last physical: Visit date not found Last MTM visit: Visit date not found         Next appt within 3 mo: Visit date not found  Next physical within 3 mo: Visit date not found  Prescriber OR PCP: Radha Thomason MD  Last diagnosis associated with med order: 1. Essential hypertension  - lisinopriL-hydrochlorothiazide (PRINZIDE,ZESTORETIC) 10-12.5 mg per tablet [Pharmacy Med Name: Lisinopril-hydroCHLOROthiazide 10-12.5 MG Oral Tablet]; Take 1 tablet by mouth once daily  Dispense: 90 tablet; Refill: 0    2. Diabetes (H)  - metFORMIN (GLUCOPHAGE-XR) 500 MG 24 hr tablet [Pharmacy Med Name: metFORMIN HCl  MG Oral Tablet Extended Release 24 Hour]; TAKE 2 TABLETS BY MOUTH ONCE DAILY WITH SUPPER  Dispense: 180 tablet; Refill: 0    3. Hyperlipemia  - pravastatin (PRAVACHOL) 10 MG tablet [Pharmacy Med Name: Pravastatin Sodium 10 MG Oral Tablet]; TAKE 1 TABLET BY MOUTH AT BEDTIME.  Dispense: 90 tablet; Refill: 0     If protocol passes may refill for 12 months if within 3 months of last provider visit (or a total of 15 months).           Failed - A1C in last 6 months     Hemoglobin A1c   Date Value Ref Range Status   07/09/2020 6.1 (H) 3.5 - 6.0 % Final               Failed - Microalbumin in last year      Microalbumin, Random Urine   Date Value Ref Range Status   04/30/2019 5.85 (H) 0.00 - 1.99 mg/dL Final                  Passed - GFR or Serum Creatinine in last 6 months     GFR MDRD Non Af Amer   Date Value Ref Range Status   07/09/2020 43 (L) >60 mL/min/1.73m2 Final     GFR MDRD Af Amer   Date Value Ref Range Status   07/09/2020 52 (L) >60 mL/min/1.73m2 Final                pravastatin (PRAVACHOL) 10 MG tablet [Pharmacy Med Name: Pravastatin Sodium 10 MG Oral Tablet] 90 tablet 0     Sig: TAKE 1 TABLET BY MOUTH AT BEDTIME       Statins Refill Protocol (Hmg CoA Reductase Inhibitors) Passed - 3/16/2021  9:55 AM        Passed - PCP or prescribing provider visit in past 12 months      Last  office visit with prescriber/PCP: Visit date not found OR same dept: Visit date not found OR same specialty: 4/30/2019 Lorene Rene CNP  Last physical: Visit date not found Last MTM visit: Visit date not found   Next visit within 3 mo: Visit date not found  Next physical within 3 mo: Visit date not found  Prescriber OR PCP: Radha Thomason MD  Last diagnosis associated with med order: 1. Essential hypertension  - lisinopriL-hydrochlorothiazide (PRINZIDE,ZESTORETIC) 10-12.5 mg per tablet [Pharmacy Med Name: Lisinopril-hydroCHLOROthiazide 10-12.5 MG Oral Tablet]; Take 1 tablet by mouth once daily  Dispense: 90 tablet; Refill: 0    2. Diabetes (H)  - metFORMIN (GLUCOPHAGE-XR) 500 MG 24 hr tablet [Pharmacy Med Name: metFORMIN HCl  MG Oral Tablet Extended Release 24 Hour]; TAKE 2 TABLETS BY MOUTH ONCE DAILY WITH SUPPER  Dispense: 180 tablet; Refill: 0    3. Hyperlipemia  - pravastatin (PRAVACHOL) 10 MG tablet [Pharmacy Med Name: Pravastatin Sodium 10 MG Oral Tablet]; TAKE 1 TABLET BY MOUTH AT BEDTIME.  Dispense: 90 tablet; Refill: 0    If protocol passes may refill for 12 months if within 3 months of last provider visit (or a total of 15 months).

## 2021-06-16 NOTE — TELEPHONE ENCOUNTER
Please call.  This is a patient of Lorene Rene's.  There is a refill request for his eye drops.  However, this mediation should be prescribed by his eye clinic as they manage that medication.    Also, please notify him that Lorene Rene has transitioned to [facility] [place].  He can follow her there or establish with another provider.

## 2021-06-16 NOTE — TELEPHONE ENCOUNTER
RN cannot approve Refill Request    RN can NOT refill this medication med is not covered by policy/route to provider. Last office visit: Visit date not found Last Physical: Visit date not found Last MTM visit: Visit date not found Last visit same specialty: 4/30/2019 Lorene Rene, CNP.  Next visit within 3 mo: Visit date not found  Next physical within 3 mo: Visit date not found      Jimena Roca, [facility] Triage/Med Refill 3/16/2021    Requested Prescriptions   Pending Prescriptions Disp Refills     latanoprost (XALATAN) 0.005 % ophthalmic solution [Pharmacy Med Name: Latanoprost 0.005 % Ophthalmic Solution] 3 mL 0     Sig: INSTILL 1 DROP INTO EACH EYE AT BEDTIME       There is no refill protocol information for this order

## 2021-06-16 NOTE — PROGRESS NOTES
Assessment/Plan:       1. Diabetes  Hemoglobin A1c is elevated when compared to last check.  I did start him on 500 mg extended release metformin twice daily.  I discussed risks versus benefits of this medication as well as potential side effects.  In addition blood work will be completed as below.  I provided him with refills of his lancets and strips so he can effectively check his blood sugars more often.  With an A1c of 8.3 I would like to see this back down to 7.  Plan following up in 3 months for diabetes check specifically A1c.  - blood glucose test strips; Use 1 each As Directed daily.  Dispense: 200 strip; Refill: 3  - lancets (ULTRA THIN LANCETS) 30 gauge Misc; Check blood sugar daily  Dispense: 200 each; Refill: 3  - metFORMIN (GLUCOPHAGE XR) 500 MG 24 hr tablet; Take 2 tablets (1,000 mg total) by mouth daily with supper.  Dispense: 180 tablet; Refill: 3  - Glycosylated Hemoglobin A1c  - Lipid Cascade  - Basic Metabolic Panel  - Microalbumin, Random Urine    2. Essential hypertension  Blood pressure is well controlled on current medications.  Continue taking these as prescribed.    3. Skin cancer screening  Evaluation of his skin shows some suspicious lesions that may be concerning for an actinic keratosis leading to possible skin cancer.  I would like him to follow-up with a dermatologist for further skin cancer screening.  - Ambulatory referral to Dermatology        Subjective:      Keyshawn Potter is a 72 y.o. male who presents for follow up of his diabetes. He travels to [place] in the winter and he returned a couple weeks ago. He is overall feeling well.  He denies any concerns regarding his diabetes.  He checks his blood sugars on a semi-regular basis and reports that his morning blood sugars typically around 150 mg/dL. he has been controlling his diabetes with dilated only.  He has not been on diabetes medications in the past.  He is currently taking lisinopril with hydrochlorothiazide and has  "had good response with blood pressure control.  He is also on a low-dose statin medication.  He currently also takes a aspirin daily.  He denies any chest pain, shortness of breath, or difficulty breathing.  He also denies any nausea, vomiting, or diarrhea.  In discussing different medications that are available for helping with management of his blood sugars he is open to starting on metformin to help improve his blood sugars overall.  We discussed this today.    The following portions of the patient's history were reviewed and updated as appropriate: allergies, current medications and problem list.    Review of Systems   Pertinent items are noted in HPI.      Objective:      /88  Pulse 84  Resp 16  Ht 5' 8\" (1.727 m)  Wt (!) 235 lb (106.6 kg)  BMI 35.73 kg/m2    General appearance: alert, appears stated age and cooperative  Head: Normocephalic, without obvious abnormality, atraumatic  Lungs: clear to auscultation bilaterally  Heart: regular rate and rhythm, S1, S2 normal, no murmur, click, rub or gallop  Extremities: extremities normal, atraumatic, no cyanosis or edema, monofilament exam within normal limits.  Pulses: 2+ and symmetric  Skin: Skin color, texture, turgor normal. No rashes or lesions.  Multiple small macular lesions on bilateral arms likely from skin damage sun damage.  He also has multiple lesions that are suspicious for actinic keratosis/potentially precancerous.    Neurologic: Grossly normal     Results for orders placed or performed in visit on 02/15/18   Glycosylated Hemoglobin A1c   Result Value Ref Range    Hemoglobin A1c 8.3 (H) 3.5 - 6.0 %       "

## 2021-06-16 NOTE — TELEPHONE ENCOUNTER
RN cannot approve Refill Request    RN can NOT refill this medication med is not covered by policy/route to provider. Last office visit: 4/30/2019 Lorene Rene CNP Last Physical: 4/25/2017 Last MTM visit: Visit date not found Last visit same specialty: 4/30/2019 Lorene Rene CNP.  Next visit within 3 mo: Visit date not found  Next physical within 3 mo: Visit date not found      Jimena Roca, [facility] Triage/Med Refill 3/24/2021    Requested Prescriptions   Pending Prescriptions Disp Refills     latanoprost (XALATAN) 0.005 % ophthalmic solution 3 mL 0       There is no refill protocol information for this order

## 2021-06-16 NOTE — PROGRESS NOTES
Assessment / Impression     1. Right ear impacted cerumen           Plan:     I recommended saline irrigation which was performed by the clinical assistant.  He tolerated the procedure well.  I recommended he avoid using Q-tips in the future.  Tips on how to prevent wax buildup in the future were given.  He may follow-up as needed.    Subjective:      HPI: Keyshawn Potter is a 72 y.o. male who this to the clinic to be evaluated for right ear plugging that he has had over the past week.  He feels like his symptoms may be due to wax buildup which is been an issue for him in the past.  He denies having pain, fevers or drainage from the ear.  He describes making his own ear lavage device at home, but he has also been using Q-tips.        Review of Systems  All other systems reviewed and are negative.     History   Smoking Status     Never Smoker   Smokeless Tobacco     Never Used       No family history on file.    Objective:     /70 (Patient Site: Left Arm, Patient Position: Sitting, Cuff Size: Adult Large)  Pulse 72  Temp 98.1  F (36.7  C) (Oral)   Resp 16  Physical Examination: General appearance - alert, well appearing, and in no distress  Eyes: pupils equal and reactive, extraocular eye movements intact  Ears: Left canal and tympanic membrane are clear.  The right canal is impacted with cerumen.  Once this was removed the tympanic membrane appeared clear.  Mouth: mucous membranes moist, pharynx normal without lesions  Neck: supple, no significant adenopathy  Neurological: alert, oriented, normal speech, no focal findings or movement disorder noted.        Recent Results (from the past 168 hour(s))   Glycosylated Hemoglobin A1c   Result Value Ref Range    Hemoglobin A1c 8.3 (H) 3.5 - 6.0 %   Lipid Cascade   Result Value Ref Range    Cholesterol 183 <=199 mg/dL    Triglycerides 263 (H) <=149 mg/dL    HDL Cholesterol 38 (L) >=40 mg/dL    LDL Calculated 92 <=129 mg/dL    Patient Fasting > 8hrs? Yes    Basic  Metabolic Panel   Result Value Ref Range    Sodium 137 136 - 145 mmol/L    Potassium 4.5 3.5 - 5.0 mmol/L    Chloride 102 98 - 107 mmol/L    CO2 27 22 - 31 mmol/L    Anion Gap, Calculation 8 5 - 18 mmol/L    Glucose 172 (H) 70 - 125 mg/dL    Calcium 9.4 8.5 - 10.5 mg/dL    BUN 19 8 - 28 mg/dL    Creatinine 1.48 (H) 0.70 - 1.30 mg/dL    GFR MDRD Af Amer 57 (L) >60 mL/min/1.73m2    GFR MDRD Non Af Amer 47 (L) >60 mL/min/1.73m2   Microalbumin, Random Urine   Result Value Ref Range    Microalbumin, Random Urine 8.82 (H) 0.00 - 1.99 mg/dL    Creatinine, Urine 159.4 mg/dL    Microalbumin/Creatinine Ratio Random Urine 55.3 (H) <=19.9 mg/g       Current Outpatient Prescriptions   Medication Sig Note     aspirin 81 MG EC tablet Take 81 mg by mouth daily.      blood glucose test strips Use one strip 3 times a day      co-enzyme Q-10 30 mg capsule Take 30 mg by mouth 3 (three) times a day.      lancets (ULTRA THIN LANCETS) 30 gauge Misc Check blood sugar 3 times daily      lisinopril-hydrochlorothiazide (ZESTORETIC) 10-12.5 mg per tablet Take 1 tablet by mouth daily.      metFORMIN (GLUCOPHAGE XR) 500 MG 24 hr tablet Take 2 tablets (1,000 mg total) by mouth daily with supper.      pravastatin (PRAVACHOL) 10 MG tablet TAKE ONE TABLET BY MOUTH AT BEDTIME      UNKNOWN TO PATIENT  4/26/2015: Blood pressure med

## 2021-06-17 NOTE — TELEPHONE ENCOUNTER
RN cannot approve Refill Request    RN can NOT refill this medication med is not covered by policy/route to provider. Last office visit: Visit date not found Last Physical: Visit date not found Last MTM visit: Visit date not found Last visit same specialty: 4/30/2019 Lorene Rene, CNP.  Next visit within 3 mo: Visit date not found  Next physical within 3 mo: Visit date not found      Heather Louis, [facility] Triage/Med Refill 5/7/2021    Requested Prescriptions   Pending Prescriptions Disp Refills     latanoprost (XALATAN) 0.005 % ophthalmic solution [Pharmacy Med Name: Latanoprost 0.005 % Ophthalmic Solution] 3 mL 0     Sig: INSTILL 1 DROP INTO EACH EYE AT BEDTIME       There is no refill protocol information for this order

## 2021-06-18 NOTE — PROGRESS NOTES
Assessment/Plan:     1. Type 2 diabetes mellitus  Type 2 diabetes is managed well on the metformin.  Recommend continuing on this medication.  He successfully decreased his hemoglobin A1c from 8.3% from February down to 6.7% on check today.  Congratulated patient on this effort and encouraged him to continue with these new efforts at managing his diabetes.  Plan on continuing the metformin.  Patient will return to the clinic in 6 months for another diabetes check.  He will return sooner if there is any questions or concerns.  - Glycosylated Hemoglobin A1c  - JIC RED    2. Sciatica of left side  Exam today reveals mild sciatica when he is laying on his back straight leg raise with toe flexion.  I discussed with patient the physical therapy as the initial course of treatment typically however does sound as though it is occurring due to muscle spasming and I opted to give patient a limited supply of Flexeril to take at night after a long day and if he is feeling that his back is spasming.  I discussed risks versus benefits of the medication as well as potential side effects.  He was encouraged to follow-up if symptoms are worsening or becoming more persistent or present on a daily basis.  I did discuss physical therapy he would like to defer this referral for now.    3. Screen for colon cancer  Patient was provided with fecal occult cards today, he has completed these in the past and has no questions.      Subjective:      Keyshawn Potter is a 72 y.o. male who presents for two concerns today.  First concern is regarding sciatica pain.  He reports that intermittently he wakes up and he will have left-sided back pain with pain shooting down his leg to about his knee in the morning.  Symptoms seem to occur after he is done more work the day before.  Initially he thought this was due to his mattress however has deducted that since it occurs with both his motor home mattress as well as his home mattress and stating these  "are both new mattresses that it should not be from this.  He denies any numbness or tingling in his legs.  Pain does not occur on a daily basis.  He is wondering if there is \"a pill that can take care of this\".  He denies any specific loss of range of motion and reports after up and moving pain is typically better and is resolving.  He will take typically one Aleve tablet which does not seem to really help the symptoms.  In terms of his diabetes he reports he has been taking the metformin on a daily basis and doing better with monitoring his diet as well as getting increased activity. He denies any side effects from the medication.   He has lost a little bit of weight as well.  His fasting blood sugars are around 111-120. He only checks once daily. He did not bring in a glucose record today. He denies any chest pain, shortness of breath, palpitations, nausea, vomiting, or diarrhea. He also denies any symptoms of incontinence or difficulties with bowel or bladder function in general.  He is due for colon cancer screening and would prefer to complete the fecal occult testing.    The following portions of the patient's history were reviewed and updated as appropriate: allergies, current medications and problem list.    Review of Systems   Pertinent items are noted in HPI.      Objective:      /70 (Patient Site: Right Arm, Patient Position: Sitting, Cuff Size: Adult Regular)  Pulse 72  Temp 98.6  F (37  C) (Oral)   Resp 16  Ht 5' 7.5\" (1.715 m)  Wt (!) 229 lb 2 oz (103.9 kg)  BMI 35.36 kg/m2    General appearance: alert, appears stated age and cooperative  Head: Normocephalic, without obvious abnormality, atraumatic  Back: symmetric, no curvature. ROM normal. No CVA tenderness. no SI joint pain.   Lungs: clear to auscultation bilaterally  Heart: regular rate and rhythm, S1, S2 normal, no murmur, click, rub or gallop  Abdomen: soft, non-tender; bowel sounds normal; no masses,  no organomegaly and positive for " umbilical hernia.  Extremities: extremities normal, atraumatic, no cyanosis or edema  Neurologic: Alert and oriented X 3, normal strength and tone. Normal symmetric reflexes. Normal coordination and gait          Results for orders placed or performed in visit on 06/07/18   Glycosylated Hemoglobin A1c   Result Value Ref Range    Hemoglobin A1c 6.7 (H) 3.5 - 6.0 %

## 2021-06-20 NOTE — LETTER
Letter by Subha Houston MD at      Author: Subha Houston MD Service: -- Author Type: --    Filed:  Encounter Date: 7/10/2020 Status: (Other)         Keyshawn Potter  [address] [address]             July 10, 2020         Dear Mr. Potter,    Below are the results from your recent visit:    Resulted Orders   Glycosylated Hemoglobin A1c   Result Value Ref Range    Hemoglobin A1c 6.1 (H) 3.5 - 6.0 %   Electrolyte Profile   Result Value Ref Range    Sodium 134 (L) 136 - 145 mmol/L    Potassium 4.2 3.5 - 5.0 mmol/L    CO2 22 22 - 31 mmol/L    Chloride 100 98 - 107 mmol/L    Anion Gap, Calculation 12 5 - 18 mmol/L   Creatinine   Result Value Ref Range    Creatinine 1.58 (H) 0.70 - 1.30 mg/dL    GFR MDRD Af Amer 52 (L) >60 mL/min/1.73m2    GFR MDRD Non Af Amer 43 (L) >60 mL/min/1.73m2   Hepatitis C Antibody (Anti-HCV)   Result Value Ref Range    Hepatitis C Ab Negative Negative   Lipid Cascade   Result Value Ref Range    Cholesterol 151 <=199 mg/dL    Triglycerides 176 (H) <=149 mg/dL    HDL Cholesterol 34 (L) >=40 mg/dL    LDL Calculated 82 <=129 mg/dL    Patient Fasting > 8hrs? Yes        Hemoglobin a1c (diabetes lab) has improved significantly-congratulations!  Triglycerides have also improved.  Remaining labs are stable.  Sodium just mildly decreased.  Hep C screening test is negative (that's good)    Please call with questions or contact us using [employer].    Sincerely,        Electronically signed by Subha Houston MD

## 2021-06-24 NOTE — TELEPHONE ENCOUNTER
RN cannot approve Refill Request    RN can NOT refill this medication overdue for office visits and/or labs.    Ronald Love, [facility] Triage/Med Refill 3/12/2019    Requested Prescriptions   Pending Prescriptions Disp Refills     blood glucose test (ACCU-CHEK CHRISS PLUS TEST STRP) strips [Pharmacy Med Name: ACCU-CHEK CHRISS PLUS  EDWARD] 200 strip 3     Sig: USE ONE STRIP 3 TIMES DAILY    Diabetic Supplies Refill Protocol Failed - 3/12/2019  8:22 AM       Failed - Visit with PCP or prescribing provider visit in last 6 months    Last office visit with prescriber/PCP: 6/7/2018 Lorene Rene CNP OR same dept: 6/7/2018 Lorene Rene CNP OR same specialty: 6/7/2018 Lorene Rene CNP  Last physical: 4/25/2017 Last MTM visit: Visit date not found   Next visit within 3 mo: Visit date not found  Next physical within 3 mo: Visit date not found  Prescriber OR PCP: Lorene Rene CNP  Last diagnosis associated with med order: 1. Diabetes (H)  - ACCU-CHEK CHRISS PLUS TEST STRP strips [Pharmacy Med Name: ACCU-CHEK CHRISS PLUS  EDWARD]; USE ONE STRIP 3 TIMES DAILY  Dispense: 200 strip; Refill: 3    If protocol passes may refill for 12 months if within 3 months of last provider visit (or a total of 15 months).            Failed - A1C in last 6 months    Hemoglobin A1c   Date Value Ref Range Status   06/07/2018 6.7 (H) 3.5 - 6.0 % Final

## 2021-07-03 NOTE — ADDENDUM NOTE
Addendum Note by Radha Thomason MD at 8/27/2020  8:52 AM     Author: Radha Thomason MD Service: -- Author Type: Physician    Filed: 8/27/2020  8:52 AM Encounter Date: 8/20/2020 Status: Signed    : Radha Thomason MD (Physician)    Addended by: RADHA THOMASON on: 8/27/2020 08:52 AM        Modules accepted: Orders

## 2021-07-03 NOTE — ADDENDUM NOTE
Addendum Note by Javi Grullon RT (R) at 4/30/2019 10:00 AM     Author: Javi Grullon RT (R) Service: -- Author Type: Radiologic Technologist    Filed: 5/3/2019  2:27 PM Encounter Date: 4/30/2019 Status: Signed    : Javi Grullon RT (R) (Radiologic Technologist)    Addended by: JAVI GRULLON on: 5/3/2019 02:27 PM        Modules accepted: Orders

## 2021-08-09 ENCOUNTER — TELEPHONE (OUTPATIENT)
Dept: FAMILY MEDICINE | Facility: CLINIC | Age: 76
End: 2021-08-09

## 2021-08-09 DIAGNOSIS — H10.13 ALLERGIC CONJUNCTIVITIS, BILATERAL: Primary | ICD-10-CM

## 2021-08-11 RX ORDER — LATANOPROST 50 UG/ML
SOLUTION/ DROPS OPHTHALMIC
Qty: 7.5 ML | Refills: 0 | Status: SHIPPED | OUTPATIENT
Start: 2021-08-11 | End: 2023-05-19

## 2021-08-11 RX ORDER — LATANOPROST 50 UG/ML
SOLUTION/ DROPS OPHTHALMIC
Qty: 3 ML | Refills: 0 | OUTPATIENT
Start: 2021-08-11

## 2021-08-11 NOTE — TELEPHONE ENCOUNTER
I will provide 1 refill. He will need to establish care with a new PCP due to location.   PATRICIA Alamo CNP

## 2021-11-09 ENCOUNTER — TELEPHONE (OUTPATIENT)
Dept: FAMILY MEDICINE | Facility: CLINIC | Age: 76
End: 2021-11-09
Payer: MEDICARE

## 2021-11-09 NOTE — LETTER
[facility]  [address]  [facility] [address]  [phone]  November 9, 2021    Keyshawn Potter  [address]  [place] [address]    Dear Keyshawn,    We care about your health and have reviewed your health plan including your medical conditions, medication list, and lab results.  Based on this review, it is recommended that you follow up regarding the following health topic(s):     -Diabetes  -Colon Cancer Screening    We recommend you take the following action(s):  -schedule a FOLLOWUP OFFICE APPOINTMENT.  We will perform the following labs:  A1c.  -schedule a COLONOSCOPY to look for colon cancer (due every 10 years or 5 years in higher risk situations.)  Colonoscopies can prevent 90-95% of colon cancer deaths.  Problem lesions can be removed before they ever become cancer.  If you do not wish to do a colonoscopy or cannot afford to do one at this time, there is another option called a Fecal Immunochemical Occult Blood Test (FIT) a take home stool sample kit.  It does not replace the colonoscopy for colorectal cancer screening, but it can detect hidden bleeding in the lower colon.  It does need to be repeated every year and if a positive result is obtained, you would be referred for a colonoscopy.  If you have completed either one of these tests at another facility, please have the records sent to our clinic for our records.    Please call us at [phone] (or use [employer]) to address the above recommendations.     Thank you for trusting [facility] and we appreciate the opportunity to serve you.  We look forward to supporting your healthcare needs in the future.    Healthy Regards,      Your Health Care Team  [facility]

## 2021-11-09 NOTE — TELEPHONE ENCOUNTER
Patient Quality Outreach      Summary:    Patient has the following on his problem list/HM:     Diabetes    Last A1C:  Lab Results   Component Value Date    A1C 6.5 05/06/2021    A1C 6.1 07/09/2020    A1C 7.3 04/30/2019       Last LDL:    Lab Results   Component Value Date    LDL 84 05/06/2021       Is the patient on a Statin? Yes          Is the patient on Aspirin? Yes    Medications     HMG CoA Reductase Inhibitors     pravastatin (PRAVACHOL) 10 MG tablet       Salicylates     aspirin (ASA) 81 MG EC tablet             Last three blood pressure readings:  BP Readings from Last 3 Encounters:   05/06/21 110/80          Tobacco Use      Smoking status: Former Smoker      Smokeless tobacco: Never Used          Hypertension   Last three blood pressure readings:  BP Readings from Last 3 Encounters:   05/06/21 110/80     Blood pressure: Passed    HTN Guidelines:  ? 139/89     Patient is due/failing the following:   Diabetes -  A1C and Diabetic Follow-Up Visit  Colon Cancer Screening -  Colonoscopy  Immunizations  -  Influenza, Tdap and Zoster    Type of outreach:    Sent letter.    Questions for provider review:    None                                                                                                                                     YIN NIX CMA

## 2022-01-04 ENCOUNTER — OFFICE VISIT (OUTPATIENT)
Dept: FAMILY MEDICINE | Facility: CLINIC | Age: 77
End: 2022-01-04
Payer: MEDICARE

## 2022-01-04 VITALS
TEMPERATURE: 97.5 F | RESPIRATION RATE: 16 BRPM | OXYGEN SATURATION: 96 % | WEIGHT: 226.6 LBS | HEART RATE: 65 BPM | BODY MASS INDEX: 34.34 KG/M2 | HEIGHT: 68 IN | SYSTOLIC BLOOD PRESSURE: 160 MMHG | DIASTOLIC BLOOD PRESSURE: 90 MMHG

## 2022-01-04 DIAGNOSIS — I10 ESSENTIAL HYPERTENSION: ICD-10-CM

## 2022-01-04 DIAGNOSIS — E11.65 TYPE 2 DIABETES MELLITUS WITH HYPERGLYCEMIA, WITHOUT LONG-TERM CURRENT USE OF INSULIN (H): Primary | ICD-10-CM

## 2022-01-04 DIAGNOSIS — E55.9 VITAMIN D DEFICIENCY: ICD-10-CM

## 2022-01-04 DIAGNOSIS — E66.01 MORBID OBESITY (H): ICD-10-CM

## 2022-01-04 DIAGNOSIS — N18.32 CHRONIC KIDNEY DISEASE, STAGE 3B (H): ICD-10-CM

## 2022-01-04 DIAGNOSIS — E11.9 TYPE 2 DIABETES MELLITUS WITHOUT COMPLICATION, WITHOUT LONG-TERM CURRENT USE OF INSULIN (H): ICD-10-CM

## 2022-01-04 DIAGNOSIS — H61.23 EXCESSIVE CERUMEN IN BOTH EAR CANALS: ICD-10-CM

## 2022-01-04 LAB
ANION GAP SERPL CALCULATED.3IONS-SCNC: 5 MMOL/L (ref 3–14)
BUN SERPL-MCNC: 19 MG/DL (ref 7–30)
CALCIUM SERPL-MCNC: 8.9 MG/DL (ref 8.5–10.1)
CHLORIDE BLD-SCNC: 104 MMOL/L (ref 94–109)
CO2 SERPL-SCNC: 26 MMOL/L (ref 20–32)
CREAT SERPL-MCNC: 1.36 MG/DL (ref 0.66–1.25)
GFR SERPL CREATININE-BSD FRML MDRD: 54 ML/MIN/1.73M2
GLUCOSE BLD-MCNC: 142 MG/DL (ref 70–99)
HBA1C MFR BLD: 6.5 % (ref 0–5.6)
POTASSIUM BLD-SCNC: 4.3 MMOL/L (ref 3.4–5.3)
SODIUM SERPL-SCNC: 135 MMOL/L (ref 133–144)

## 2022-01-04 PROCEDURE — 99214 OFFICE O/P EST MOD 30 MIN: CPT | Performed by: NURSE PRACTITIONER

## 2022-01-04 PROCEDURE — 80048 BASIC METABOLIC PNL TOTAL CA: CPT | Performed by: NURSE PRACTITIONER

## 2022-01-04 PROCEDURE — 36415 COLL VENOUS BLD VENIPUNCTURE: CPT | Performed by: NURSE PRACTITIONER

## 2022-01-04 PROCEDURE — 83036 HEMOGLOBIN GLYCOSYLATED A1C: CPT | Performed by: NURSE PRACTITIONER

## 2022-01-04 RX ORDER — AMLODIPINE AND BENAZEPRIL HYDROCHLORIDE 5; 10 MG/1; MG/1
1 CAPSULE ORAL DAILY
Qty: 90 CAPSULE | Refills: 3 | Status: SHIPPED | OUTPATIENT
Start: 2022-01-04 | End: 2022-01-05 | Stop reason: ALTCHOICE

## 2022-01-04 RX ORDER — LATANOPROST 50 UG/ML
SOLUTION/ DROPS OPHTHALMIC
Qty: 7.5 ML | Refills: 0 | Status: CANCELLED | OUTPATIENT
Start: 2022-01-04

## 2022-01-04 ASSESSMENT — MIFFLIN-ST. JEOR: SCORE: 1732.35

## 2022-01-04 ASSESSMENT — PAIN SCALES - GENERAL: PAINLEVEL: NO PAIN (0)

## 2022-01-04 NOTE — PROGRESS NOTES
"  Assessment & Plan     Type 2 diabetes mellitus with hyperglycemia, without long-term current use of insulin (H)  Chronic, stable, continue current treatment.   - Basic metabolic panel  (Ca, Cl, CO2, Creat, Gluc, K, Na, BUN)  - Hemoglobin A1c  - Adult Eye Referral; Future    Essential hypertension  Uncontrolled.     - Original plan was to stop Lisinopril.  Start amLODIPine-benazepril (LOTREL) 5-10 MG capsule; Take 1 capsule by mouth daily  - Home Blood Pressure Monitor Order for DME - ONLY FOR DME  Recommend starting home blood pressure monitoring.    Addendum:  Patient called 1/5/2022 stating he realized that he has NOT been taking Lisinopril 10mg \"at least for a few months\" as he found a bottle of 90 tablets in the back of his cupboard this morning.     New plan is to restart Lisinopril 10 mg daily and to follow up in 4 weeks for BP recheck (okay to do virtual visit if patient is able to reliably monitor his BP at home)    Chronic kidney disease, stage 3b (H)  - monitor BMP today    Morbid obesity (H)  Known issue that I take into account for their medical decisions, no current exacerbations or new concerns.     Excessive cerumen in both ear canals    - carbamide peroxide (DEBROX) 6.5 % otic solution; Place 5 drops into both ears 2 times daily        BMI:   Estimated body mass index is 34.45 kg/m  as calculated from the following:    Height as of this encounter: 1.727 m (5' 8\").    Weight as of this encounter: 102.8 kg (226 lb 9.6 oz).         Return in about 4 weeks (around 2/1/2022) for BP Recheck.    Yazmin Trivedi NP  [facility]    Bjorn Mahajan is a 76 year old who presents for the following health issues     HPI     Diabetes Follow-up    How often are you checking your blood sugar? A few times a week  What time of day are you checking your blood sugars (select all that apply)?  Before meals  Have you had any blood sugars above 200?  No  Have you had any blood sugars " "below 70?  No    What symptoms do you notice when your blood sugar is low?  None    What concerns do you have today about your diabetes? None     Do you have any of these symptoms? (Select all that apply)  No numbness or tingling in feet.  No redness, sores or blisters on feet.  No complaints of excessive thirst.  No reports of blurry vision.  No significant changes to weight.    Have you had a diabetic eye exam in the last 12 months? No, about 1 yr ago     BP Readings from Last 2 Encounters:   01/04/22 (!) 160/90   05/06/21 110/80     Hemoglobin A1C POCT (%)   Date Value   05/06/2021 6.5 (H)     Hemoglobin A1C (%)   Date Value   01/04/2022 6.5 (H)   07/09/2020 6.1 (H)     LDL Cholesterol Calculated (mg/dL)   Date Value   05/06/2021 84   07/09/2020 82   04/30/2019 81     LDL Cholesterol Direct (mg/dL)   Date Value   07/02/2013 107               Dizzy spell in October 2021, while out of town.  Was lying down in his RV thought it could be corbon monoixide but was seen at Hospital in [place].  He had elevated BP at that time but he says the ED never told him he had a high BP.    He still gets some dizziness when he rolls over.      How many servings of fruits and vegetables do you eat daily?  0-1    On average, how many sweetened beverages do you drink each day (Examples: soda, juice, sweet tea, etc.  Do NOT count diet or artificially sweetened beverages)?   0    How many days per week do you exercise enough to make your heart beat faster? Not in  Winter     How many minutes a day do you exercise enough to make your heart beat faster? Not in winter     How many days per week do you miss taking your medication? 0    Review of Systems   Constitutional, HEENT, cardiovascular, pulmonary, gi and gu systems are negative, except as otherwise noted.      Objective    BP (!) 160/90   Pulse 65   Temp 97.5  F (36.4  C) (Oral)   Resp 16   Ht 1.727 m (5' 8\")   Wt 102.8 kg (226 lb 9.6 oz)   SpO2 96%   BMI 34.45 kg/m    Body mass " index is 34.45 kg/m .  Physical Exam   GENERAL: healthy, alert, no distress and obese  HENT: both ears: excessive cerumen bilateral dark brown and poor dentition  RESP: lungs clear to auscultation - no rales, rhonchi or wheezes  CV: regular rates and rhythm, normal S1 S2, no S3 or S4 and no murmur, click or rub    Results for orders placed or performed in visit on 01/04/22   Hemoglobin A1c     Status: Abnormal   Result Value Ref Range    Hemoglobin A1C 6.5 (H) 0.0 - 5.6 %

## 2022-01-04 NOTE — PATIENT INSTRUCTIONS
Your Blood Pressure is high today.    Stop the Lisinopril 10 mg.    Start Amlodipine-Benazepril (Lotrel) 5-10 mg capsule daily.    Follow up in the clinic in 4 weeks to recheck your blood pressure.    You were given a paper prescription to get a Home Blood Pressure monitor.  Start monitoring your BP at home.  Goal BP is <139/89    [facility]     Discharged by :   Paper scripts provided to patient :      If you have any questions regarding your visit please contact your care team:     Team Edyta              Clinic Hours Telephone Number     Dr. Michelle Trivedi, CNP  Dr. Ania Carreno   7am-7pm  Monday - Thursday   7am-5pm  Fridays  [phone]   (Appointment scheduling available 24/7)     RN Line  [phone] option 2     Urgent Care - [place] - 11am-9pm Monday-Friday Saturday-Sunday- 9am-5pm     [place] -   5pm-9pm Monday-Friday Saturday-Sunday- 9am-5pm    [phone] - [place]    [phone] - [place]     For a Price Quote for your services, please call our Consumer Price Line at [phone].     What options do I have for visits at the clinic other than the traditional office visit?     To expand how we care for you, many of our providers are utilizing electronic visits (e-visits) and telephone visits, when medically appropriate, for interactions with their patients rather than a visit in the clinic. We also offer nurse visits for many medical concerns. Just like any other service, we will bill your insurance company for this type of visit based on time spent on the phone with your provider. Not all insurance companies cover these visits. Please check with your medical insurance if this type of visit is covered. You will be responsible for any charges that are not paid by your insurance.     E-visits via [employer]: generally incur a $45.00 fee.     Telephone visits:  Time spent on the phone: *charged based on time that is spent on  the phone in increments of 10 minutes. Estimated cost:   5-10 mins $30.00   11-20 mins. $59.00   21-30 mins. $85.00       Use [facility] (secure email communication and access to your chart) to send your primary care provider a message or make an appointment. Ask someone on your Team how to sign up for [facility].     As always, Thank you for trusting us with your health care needs!      [facility]:    Scheduling Appointments  [place] [place]  Call: [phone]    [facility]ice [facility]  Call: [phone]    [facility]  Call: [phone]    For Gastroenterology referrals   [facility]   Clinics and Surgery Center, 4th Floor   [address] [place] [address]   Appointments: [phone]    WHERE TO GO FOR CARE?    Clinic    Make an appointment if you:       Are sick (cold, cough, flu, sore throat, earache or in pain).       Have a small injury (sprain, small cut, burn or broken bone).       Need a physical exam, Pap smear, vaccine or prescription refill.       Have questions about your health or medicines.    To reach us:      Call [phone] ([phone]). Open 24 hours every day. (For counseling services, call [phone].)    Log into [facility] at [email or web address]. (Visit [email or web address] to create an account.) Hospital emergency room    An emergency is a serious or life- threatening problem that must be treated right away.    Call [phone] or get to the hospital if you have:      Very bad or sudden:            - Chest pain or pressure         - Bleeding         - Head or belly pain         - Dizziness or trouble seeing, walking or                          Speaking      Problems breathing      Blood in your vomit or you are coughing up blood      A major injury (knocked out, loss of a finger or limb, rape, broken bone protruding from skin)    A mental health crisis. (Or call the Mental Health Crisis line  at 1-682.306.6599 or Suicide Prevention Hotline at 1-877.434.8702.)    Open 24 hours every day. You don't need an appointment.     Urgent care    Visit urgent care for sickness or small injuries when the clinic is closed. You don't need an appointment. To check hours or find an urgent care near you, visit [email or web address]. Online care    Get online care from [facility] for more than 70 common problems, like colds, allergies and infections. Open 24 hours every day at:   [email or web address]   Need help deciding?    For advice about where to be seen, you may call your clinic and ask to speak with a nurse. We're here for you 24 hours every day.         If you are deaf or hard of hearing, please let us know. We provide many free services including sign language interpreters, oral interpreters, TTYs, telephone amplifiers, note takers and written materials.

## 2022-01-05 ENCOUNTER — TELEPHONE (OUTPATIENT)
Dept: FAMILY MEDICINE | Facility: CLINIC | Age: 77
End: 2022-01-05
Payer: MEDICARE

## 2022-01-05 RX ORDER — LISINOPRIL 10 MG/1
10 TABLET ORAL DAILY
Qty: 90 TABLET | Refills: 3 | Status: SHIPPED | OUTPATIENT
Start: 2022-01-05 | End: 2022-10-13

## 2022-01-05 NOTE — TELEPHONE ENCOUNTER
Reviewed update.    And recommend patient restarts the Lisinopril 10 mg daily.  (Not Lotrel)  I updated his med list and resent the Lisinopril 10 mg to his pharmacy so he will have available.  Please also let patient know that his labs are stable.    Recommend follow up in 4 weeks for BP recheck (okay to do virtual visit if patient is able to reliably monitor his BP at home).    Yazmin Trivedi, DNP, APRN, CNP

## 2022-01-05 NOTE — TELEPHONE ENCOUNTER
"Patient seen yesterday for BP follow up. He thought that he was taking Lisinopril 10mg daily for BP. Today, he realized that he has NOT been taking Lisinopril 10mg \"at least for a few months\" as he found a bottle of 90 tablets in the back of his cupboard this morning.     Per OV note:  \"Essential hypertension  Uncontrolled.      - Stop Lisinopril.  Start amLODIPine-benazepril (LOTREL) 5-10 MG capsule; Take 1 capsule by mouth daily  - Home Blood Pressure Monitor Order for DME - ONLY FOR DME  Recommend starting home blood pressure monitoring.    Patient has NOT picked up the Lotrel Rx. He is wondering if he should resume the lisinopril Rx or change to Lotrel as recommended yesterday?     Routing to provider seen to review and advise.     Flaquita Pineda, RN, BSN, PHN  [facility] [address]    "

## 2022-01-05 NOTE — TELEPHONE ENCOUNTER
RN called and spoke with patient.    RN reviewed providers message and directions.    Patient verbalized understanding and will not  Lotrel.    RN advised of needing appointment in 1 month.    Patient will call to schedule.    Son Khan RN, BSN, PHN  [facility]

## 2022-01-13 ENCOUNTER — TELEPHONE (OUTPATIENT)
Dept: FAMILY MEDICINE | Facility: CLINIC | Age: 77
End: 2022-01-13
Payer: MEDICARE

## 2022-01-13 NOTE — TELEPHONE ENCOUNTER
"Routing to Yazmin Trivedi    Patient requesting something for dizziness?    Patient is due for B/p follow up around 2/1    When calling patient back, please assist in scheduling.      Discussed at office visit    Office visit 1/4    Dizzy spell in October 2021, while out of town.  Was lying down in his RV thought it could be corbon monoixide but was seen at [place] in [place].  He had elevated BP at that time but he says the ED never told him he had a high BP.     He still gets some dizziness when he rolls over.    Essential hypertension  Uncontrolled.      - Original plan was to stop Lisinopril.  Start amLODIPine-benazepril (LOTREL) 5-10 MG capsule; Take 1 capsule by mouth daily  - Home Blood Pressure Monitor Order for DME - ONLY FOR DME  Recommend starting home blood pressure monitoring.     Addendum:  Patient called 1/5/2022 stating he realized that he has NOT been taking Lisinopril 10mg \"at least for a few months\" as he found a bottle of 90 tablets in the back of his cupboard this morning.      New plan is to restart Lisinopril 10 mg daily and to follow up in 4 weeks for BP recheck (okay to do virtual visit if patient is able to reliably monitor his BP at home)       Son Khan, RN, BSN, PHN  [facility]     "

## 2022-01-13 NOTE — TELEPHONE ENCOUNTER
Patient saw Yazmin Trivedi on 1-4-22 and mentioned he was experiencing dizziness.  Would like a prescription for that.    Please call patient when done or if you have any questions.  OK to LM on VM    Patient uses [facility] in [facility]

## 2022-01-17 NOTE — TELEPHONE ENCOUNTER
Is patient checking his BP at home and if so what is his BP been running since he restarted Lisinopril 10 mg?    Yazmin Trivedi, DNP, APRN, CNP

## 2022-01-17 NOTE — TELEPHONE ENCOUNTER
"He has a home BP cuff and reports /80 but not sure if this is accurate.  He plans on getting a new BP cuff today.   Pt mentions he hadn't taken his lisinopril for 3 months then started taking again about 1 week ago, although his dizziness started about Oct.      He feels \"tipsy\" when he stands up then starts walking.   An appointment was made for him next week with Provider.     1. He should wait at least 30 seconds when going from a sitting to standing position before walking- holding on for support in case he gets dizzy.  2. He should drink plenty of water- at least 64 ounces  3. Record BP this week with new cuff and bring with for provider to check. Apt made 1/24  4. Call back if any concerns    Sharon Duron RN  "

## 2022-01-24 ENCOUNTER — OFFICE VISIT (OUTPATIENT)
Dept: FAMILY MEDICINE | Facility: CLINIC | Age: 77
End: 2022-01-24
Payer: MEDICARE

## 2022-01-24 VITALS — WEIGHT: 224.4 LBS | TEMPERATURE: 98.2 F | BODY MASS INDEX: 34.01 KG/M2 | OXYGEN SATURATION: 96 % | HEIGHT: 68 IN

## 2022-01-24 DIAGNOSIS — H61.23 BILATERAL IMPACTED CERUMEN: ICD-10-CM

## 2022-01-24 DIAGNOSIS — R42 DIZZINESS: Primary | ICD-10-CM

## 2022-01-24 DIAGNOSIS — Z23 HIGH PRIORITY FOR 2019-NCOV VACCINE: ICD-10-CM

## 2022-01-24 DIAGNOSIS — I10 ESSENTIAL HYPERTENSION: ICD-10-CM

## 2022-01-24 PROBLEM — R97.20 ELEVATED PROSTATE SPECIFIC ANTIGEN (PSA): Status: ACTIVE | Noted: 2021-05-06

## 2022-01-24 PROBLEM — K76.0 NAFLD (NONALCOHOLIC FATTY LIVER DISEASE): Status: ACTIVE | Noted: 2021-05-06

## 2022-01-24 PROBLEM — E55.9 VITAMIN D DEFICIENCY: Status: ACTIVE | Noted: 2021-05-06

## 2022-01-24 PROBLEM — D48.5 NEOPLASM OF UNCERTAIN BEHAVIOR OF SKIN: Status: ACTIVE | Noted: 2021-05-06

## 2022-01-24 PROCEDURE — 91305 COVID-19,PF,PFIZER (12+ YRS): CPT | Performed by: NURSE PRACTITIONER

## 2022-01-24 PROCEDURE — 69209 REMOVE IMPACTED EAR WAX UNI: CPT | Mod: RT | Performed by: NURSE PRACTITIONER

## 2022-01-24 PROCEDURE — 99213 OFFICE O/P EST LOW 20 MIN: CPT | Mod: 25 | Performed by: NURSE PRACTITIONER

## 2022-01-24 PROCEDURE — 0053A COVID-19,PF,PFIZER (12+ YRS): CPT | Performed by: NURSE PRACTITIONER

## 2022-01-24 RX ORDER — MECLIZINE HYDROCHLORIDE 25 MG/1
25 TABLET ORAL 3 TIMES DAILY PRN
Qty: 30 TABLET | Refills: 1 | Status: SHIPPED | OUTPATIENT
Start: 2022-01-24 | End: 2022-08-12

## 2022-01-24 ASSESSMENT — MIFFLIN-ST. JEOR: SCORE: 1722.37

## 2022-01-24 ASSESSMENT — PAIN SCALES - GENERAL: PAINLEVEL: MILD PAIN (2)

## 2022-01-24 NOTE — PROGRESS NOTES
"  Assessment & Plan     Dizziness  Patient will monitor to see if symptoms resolve since cerumen impaction was removed today.  If symptoms do not resolve and he will try the meclizine.  If any persistent symptoms then would recommend follow-up with ENT.  - meclizine (ANTIVERT) 25 MG tablet; Take 1 tablet (25 mg) by mouth 3 times daily as needed for dizziness    Bilateral impacted cerumen  Successfully removed with ear wash  - REMOVE IMPACTED CERUMEN    Essential hypertension  Chronic, stable, continue current treatment.   - Home Blood Pressure Monitor Order for DME - ONLY FOR DME    High priority for 2019-nCoV vaccine    - COVID-19,PF,PFIZER (12+ Yrs GRAY LABEL)             BMI:   Estimated body mass index is 34.12 kg/m  as calculated from the following:    Height as of this encounter: 1.727 m (5' 8\").    Weight as of this encounter: 101.8 kg (224 lb 6.4 oz).         No follow-ups on file.    Yazmin Trivedi NP  [facility]    Bjorn Mahajan is a 76 year old who presents for the following health issues     HPI     Dizziness  Onset/Duration: 10/06/2021  Description:   Do you feel faint: no  Does it feel like the surroundings (bed, room) are moving: no  Unsteady/off balance: YES  Have you passed out or fallen: no  Intensity: mild  Progression of Symptoms: same and intermittent  Accompanying Signs & Symptoms:  Heart palpitations or chest pain: no  Nausea, vomiting: no  Weakness or lack of coordination in arms or legs: YES  Vision or speech changes: no  Numbness or tingling: no  Ringing in ears (Tinnitus): no  Hearing Loss: no  History:   Head trauma/concussion history: no  Previous similar symptoms: no  Recent bleeding history: no  Any new medications (BP?): no, but since his last visit he has restarted the lisinopril 10 mg  Precipitating factors:   Worse with activity: no  Worse with head movement: no  Alleviating factors:   Does staying in a fixed position give relief: no   Therapies " "tried and outcome: Nothing other than BP meds   Not a spinning sensation.  But just does not feel right.  Nearly feel forward when he was working on a car.    Does not drink alcohol.  Drinks diet coke 6 cans per day.  Tries to get caffeine free but where he purchase is the soda they no longer offer caffeine free option.    Review of Systems   Constitutional, HEENT, cardiovascular, pulmonary, gi and gu systems are negative, except as otherwise noted.      Objective    Temp 98.2  F (36.8  C) (Oral)   Ht 1.727 m (5' 8\")   Wt 101.8 kg (224 lb 6.4 oz)   SpO2 96%   BMI 34.12 kg/m    Body mass index is 34.12 kg/m .     Orthostatics:  /80 and pulse 80 in supine position  /80, pulse 82 in sitting position  /80, pulse 83 in standing position    Physical Exam   GENERAL: healthy, alert, no distress and obese  HENT: both ears: occluded with wax.  Post ear irrigation by medical assistant the canals were clear of wax bilateral and TMs visualized and were dull  RESP: lungs clear to auscultation - no rales, rhonchi or wheezes  CV: regular rates and rhythm, normal S1 S2, no S3 or S4 and no murmur, click or rub  PSYCH: mentation appears normal, affect normal/bright            "

## 2022-04-13 DIAGNOSIS — E11.9 TYPE 2 DIABETES MELLITUS WITHOUT COMPLICATION, WITHOUT LONG-TERM CURRENT USE OF INSULIN (H): ICD-10-CM

## 2022-04-15 RX ORDER — METFORMIN HCL 500 MG
TABLET, EXTENDED RELEASE 24 HR ORAL
Qty: 90 TABLET | Refills: 1 | Status: SHIPPED | OUTPATIENT
Start: 2022-04-15 | End: 2022-08-05

## 2022-05-11 DIAGNOSIS — E78.5 HYPERLIPIDEMIA LDL GOAL <100: ICD-10-CM

## 2022-05-11 NOTE — TELEPHONE ENCOUNTER
Pending Prescriptions:                       Disp   Refills    pravastatin (PRAVACHOL) 10 MG tablet      90 tab*3            Sig: Take 1 tablet (10 mg) by mouth At Bedtime    Routing refill request to provider for review/approval because:  Labs not current:    LDL Cholesterol Calculated   Date Value Ref Range Status   05/06/2021 84 <100 mg/dL Final     Comment:     Desirable:       <100 mg/dl         Preston Ricci RN

## 2022-05-12 RX ORDER — PRAVASTATIN SODIUM 10 MG
10 TABLET ORAL AT BEDTIME
Qty: 90 TABLET | Refills: 0 | Status: SHIPPED | OUTPATIENT
Start: 2022-05-12 | End: 2022-08-17

## 2022-08-03 DIAGNOSIS — E11.9 TYPE 2 DIABETES MELLITUS WITHOUT COMPLICATION, WITHOUT LONG-TERM CURRENT USE OF INSULIN (H): ICD-10-CM

## 2022-08-03 NOTE — LETTER
August 17, 2022      Keyshawn Potter  [address] [facility] [address]      Your healthcare team cares about your health. To provide you with the best care, we have reviewed your chart and based on our findings, we see that you are due to:     - DIABETES FOLLOW UP: Schedule a diabetic follow up appointment as a Office Visit. Patients with diabetes should generally see their provider every 3-6 months.      If you have already completed these items, please contact the clinic via phone or Mychart so your care team can review and update your records.  Thank you for choosing [facility] for your healthcare needs. For any questions, concerns, or to schedule an appointment please contact the clinic.       Healthy Regards,      Your [facility] Care Team

## 2022-08-04 NOTE — TELEPHONE ENCOUNTER
Routing refill request to provider for review/approval because:  Failed protocol.      Radha Neri RN

## 2022-08-05 RX ORDER — METFORMIN HCL 500 MG
TABLET, EXTENDED RELEASE 24 HR ORAL
Qty: 90 TABLET | Refills: 0 | Status: SHIPPED | OUTPATIENT
Start: 2022-08-05 | End: 2022-09-14

## 2022-08-05 NOTE — TELEPHONE ENCOUNTER
This patient is overdue for advised follow up, which is why he/she is out of refills.  I do not want this patient to go without medication - so one refill has been provided to allow time for appointment scheduling.  Please notify the patient and assist with scheduling follow up.

## 2022-08-08 NOTE — TELEPHONE ENCOUNTER
Called and spoke with patient. Patient states he will call back later to get an appt scheduled.    Janice See LOTTIE Frances

## 2022-08-12 DIAGNOSIS — R42 DIZZINESS: ICD-10-CM

## 2022-08-12 RX ORDER — MECLIZINE HYDROCHLORIDE 25 MG/1
25 TABLET ORAL 3 TIMES DAILY PRN
Qty: 30 TABLET | Refills: 1 | Status: SHIPPED | OUTPATIENT
Start: 2022-08-12 | End: 2023-05-19

## 2022-08-12 NOTE — TELEPHONE ENCOUNTER
Prescription approved per [facility] Refill Protocol.    Kendra Epperson RN   [facility]

## 2022-08-16 NOTE — TELEPHONE ENCOUNTER
Patient has been called to ask about appoint,emt on 8/08/2022. Will call patient again.     Francia Harris CMA

## 2022-09-13 DIAGNOSIS — E11.9 TYPE 2 DIABETES MELLITUS WITHOUT COMPLICATION, WITHOUT LONG-TERM CURRENT USE OF INSULIN (H): ICD-10-CM

## 2022-09-14 RX ORDER — METFORMIN HCL 500 MG
TABLET, EXTENDED RELEASE 24 HR ORAL
Qty: 90 TABLET | Refills: 0 | Status: SHIPPED | OUTPATIENT
Start: 2022-09-14 | End: 2022-10-12

## 2022-10-12 ENCOUNTER — OFFICE VISIT (OUTPATIENT)
Dept: FAMILY MEDICINE | Facility: CLINIC | Age: 77
End: 2022-10-12
Payer: MEDICARE

## 2022-10-12 VITALS
TEMPERATURE: 97.8 F | BODY MASS INDEX: 34.68 KG/M2 | WEIGHT: 228.8 LBS | HEIGHT: 68 IN | DIASTOLIC BLOOD PRESSURE: 90 MMHG | HEART RATE: 68 BPM | SYSTOLIC BLOOD PRESSURE: 148 MMHG | RESPIRATION RATE: 16 BRPM | OXYGEN SATURATION: 96 %

## 2022-10-12 DIAGNOSIS — E11.9 TYPE 2 DIABETES MELLITUS WITHOUT COMPLICATION, WITHOUT LONG-TERM CURRENT USE OF INSULIN (H): ICD-10-CM

## 2022-10-12 DIAGNOSIS — Z00.00 ROUTINE HISTORY AND PHYSICAL EXAMINATION OF ADULT: Primary | ICD-10-CM

## 2022-10-12 DIAGNOSIS — Z12.5 SCREENING FOR PROSTATE CANCER: ICD-10-CM

## 2022-10-12 DIAGNOSIS — R97.20 ELEVATED PROSTATE SPECIFIC ANTIGEN (PSA): ICD-10-CM

## 2022-10-12 DIAGNOSIS — H10.13 ALLERGIC CONJUNCTIVITIS, BILATERAL: ICD-10-CM

## 2022-10-12 DIAGNOSIS — K76.0 NAFLD (NONALCOHOLIC FATTY LIVER DISEASE): ICD-10-CM

## 2022-10-12 DIAGNOSIS — I10 ESSENTIAL HYPERTENSION: ICD-10-CM

## 2022-10-12 DIAGNOSIS — N18.32 CHRONIC KIDNEY DISEASE, STAGE 3B (H): ICD-10-CM

## 2022-10-12 DIAGNOSIS — E78.5 HYPERLIPIDEMIA LDL GOAL <100: ICD-10-CM

## 2022-10-12 PROBLEM — E66.9 OBESITY: Status: ACTIVE | Noted: 2019-04-30

## 2022-10-12 LAB
ALBUMIN SERPL-MCNC: 3.8 G/DL (ref 3.4–5)
ALBUMIN UR-MCNC: 100 MG/DL
ALP SERPL-CCNC: 76 U/L (ref 40–150)
ALT SERPL W P-5'-P-CCNC: 42 U/L (ref 0–70)
ANION GAP SERPL CALCULATED.3IONS-SCNC: 8 MMOL/L (ref 3–14)
APPEARANCE UR: CLEAR
AST SERPL W P-5'-P-CCNC: 33 U/L (ref 0–45)
BACTERIA #/AREA URNS HPF: ABNORMAL /HPF
BILIRUB SERPL-MCNC: 0.8 MG/DL (ref 0.2–1.3)
BILIRUB UR QL STRIP: NEGATIVE
BUN SERPL-MCNC: 16 MG/DL (ref 7–30)
CALCIUM SERPL-MCNC: 8.9 MG/DL (ref 8.5–10.1)
CHLORIDE BLD-SCNC: 106 MMOL/L (ref 94–109)
CHOLEST SERPL-MCNC: 147 MG/DL
CO2 SERPL-SCNC: 22 MMOL/L (ref 20–32)
COLOR UR AUTO: YELLOW
CREAT SERPL-MCNC: 1.31 MG/DL (ref 0.66–1.25)
CREAT UR-MCNC: 247 MG/DL
DEPRECATED CALCIDIOL+CALCIFEROL SERPL-MC: 58 UG/L (ref 20–75)
FASTING STATUS PATIENT QL REPORTED: YES
GFR SERPL CREATININE-BSD FRML MDRD: 56 ML/MIN/1.73M2
GLUCOSE BLD-MCNC: 132 MG/DL (ref 70–99)
GLUCOSE UR STRIP-MCNC: 100 MG/DL
HBA1C MFR BLD: 7 % (ref 0–5.6)
HDLC SERPL-MCNC: 33 MG/DL
HGB BLD-MCNC: 15.5 G/DL (ref 13.3–17.7)
HGB UR QL STRIP: NEGATIVE
KETONES UR STRIP-MCNC: NEGATIVE MG/DL
LDLC SERPL CALC-MCNC: 64 MG/DL
LEUKOCYTE ESTERASE UR QL STRIP: NEGATIVE
MICROALBUMIN UR-MCNC: 417 MG/L
MICROALBUMIN/CREAT UR: 168.83 MG/G CR (ref 0–17)
MUCOUS THREADS #/AREA URNS LPF: PRESENT /LPF
NITRATE UR QL: NEGATIVE
NONHDLC SERPL-MCNC: 114 MG/DL
PH UR STRIP: 5 [PH] (ref 5–7)
PHOSPHATE SERPL-MCNC: 3 MG/DL (ref 2.5–4.5)
POTASSIUM BLD-SCNC: 4 MMOL/L (ref 3.4–5.3)
PROT SERPL-MCNC: 7.8 G/DL (ref 6.8–8.8)
PSA SERPL-MCNC: 7.25 UG/L (ref 0–4)
PTH-INTACT SERPL-MCNC: 33 PG/ML (ref 15–65)
RBC #/AREA URNS AUTO: ABNORMAL /HPF
SODIUM SERPL-SCNC: 136 MMOL/L (ref 133–144)
SP GR UR STRIP: >=1.03 (ref 1–1.03)
SQUAMOUS #/AREA URNS AUTO: ABNORMAL /LPF
TRIGL SERPL-MCNC: 250 MG/DL
UROBILINOGEN UR STRIP-ACNC: 0.2 E.U./DL
WBC #/AREA URNS AUTO: ABNORMAL /HPF

## 2022-10-12 PROCEDURE — 36415 COLL VENOUS BLD VENIPUNCTURE: CPT | Performed by: NURSE PRACTITIONER

## 2022-10-12 PROCEDURE — 84100 ASSAY OF PHOSPHORUS: CPT | Performed by: NURSE PRACTITIONER

## 2022-10-12 PROCEDURE — 99214 OFFICE O/P EST MOD 30 MIN: CPT | Mod: 25 | Performed by: NURSE PRACTITIONER

## 2022-10-12 PROCEDURE — 82043 UR ALBUMIN QUANTITATIVE: CPT | Performed by: NURSE PRACTITIONER

## 2022-10-12 PROCEDURE — 84153 ASSAY OF PSA TOTAL: CPT | Performed by: NURSE PRACTITIONER

## 2022-10-12 PROCEDURE — 83036 HEMOGLOBIN GLYCOSYLATED A1C: CPT | Performed by: NURSE PRACTITIONER

## 2022-10-12 PROCEDURE — 82306 VITAMIN D 25 HYDROXY: CPT | Performed by: NURSE PRACTITIONER

## 2022-10-12 PROCEDURE — 85018 HEMOGLOBIN: CPT | Performed by: NURSE PRACTITIONER

## 2022-10-12 PROCEDURE — 80053 COMPREHEN METABOLIC PANEL: CPT | Performed by: NURSE PRACTITIONER

## 2022-10-12 PROCEDURE — 81001 URINALYSIS AUTO W/SCOPE: CPT | Performed by: NURSE PRACTITIONER

## 2022-10-12 PROCEDURE — G0439 PPPS, SUBSEQ VISIT: HCPCS | Performed by: NURSE PRACTITIONER

## 2022-10-12 PROCEDURE — 80061 LIPID PANEL: CPT | Performed by: NURSE PRACTITIONER

## 2022-10-12 PROCEDURE — 83970 ASSAY OF PARATHORMONE: CPT | Performed by: NURSE PRACTITIONER

## 2022-10-12 RX ORDER — DORZOLAMIDE HYDROCHLORIDE AND TIMOLOL MALEATE 20; 5 MG/ML; MG/ML
SOLUTION/ DROPS OPHTHALMIC
Status: CANCELLED | OUTPATIENT
Start: 2022-10-12

## 2022-10-12 RX ORDER — METFORMIN HCL 500 MG
TABLET, EXTENDED RELEASE 24 HR ORAL
Qty: 180 TABLET | Refills: 1 | Status: SHIPPED | OUTPATIENT
Start: 2022-10-12 | End: 2023-04-06

## 2022-10-12 RX ORDER — PRAVASTATIN SODIUM 10 MG
10 TABLET ORAL AT BEDTIME
Qty: 90 TABLET | Refills: 3 | Status: SHIPPED | OUTPATIENT
Start: 2022-10-12 | End: 2023-09-28

## 2022-10-12 RX ORDER — AMLODIPINE AND BENAZEPRIL HYDROCHLORIDE 5; 10 MG/1; MG/1
1 CAPSULE ORAL DAILY
Qty: 90 CAPSULE | Refills: 0 | Status: SHIPPED | OUTPATIENT
Start: 2022-10-12 | End: 2022-11-09

## 2022-10-12 RX ORDER — LATANOPROST 50 UG/ML
SOLUTION/ DROPS OPHTHALMIC
Qty: 7.5 ML | Refills: 0 | Status: CANCELLED | OUTPATIENT
Start: 2022-10-12

## 2022-10-12 ASSESSMENT — ACTIVITIES OF DAILY LIVING (ADL): CURRENT_FUNCTION: NO ASSISTANCE NEEDED

## 2022-10-12 ASSESSMENT — PAIN SCALES - GENERAL: PAINLEVEL: NO PAIN (0)

## 2022-10-12 NOTE — LETTER
October 14, 2022      Keyshawn Potter  [address] [facility] [address]        Dear Keyshawn,       We are writing to inform you of your test results.  Your urine albumin level has risen which means your kidney disease has progressed- but by getting your blood pressure and diabetes under better control this can help to limit progression.     Your PSA prostate cancer screening continues to rise, and as we discussed recommend you follow up with Urology [phone].     We will see you at your next appointment at [facility] on 11/9/22.     Resulted Orders   Vitamin D Deficiency   Result Value Ref Range    Vitamin D, Total (25-Hydroxy) 58 20 - 75 ug/L    Narrative    Season, race, dietary intake, and treatment affect the concentration of 25-hydroxy-Vitamin D. Values may decrease during winter months and increase during summer months. Values 20-29 ug/L may indicate Vitamin D insufficiency and values <20 ug/L may indicate Vitamin D deficiency.    Vitamin D determination is routinely performed by an immunoassay specific for 25 hydroxyvitamin D3.  If an individual is on vitamin D2(ergocalciferol) supplementation, please specify 25 OH vitamin D2 and D3 level determination by LCMSMS test VITD23.     Comprehensive metabolic panel (BMP + Alb, Alk Phos, ALT, AST, Total. Bili, TP)   Result Value Ref Range    Sodium 136 133 - 144 mmol/L    Potassium 4.0 3.4 - 5.3 mmol/L    Chloride 106 94 - 109 mmol/L    Carbon Dioxide (CO2) 22 20 - 32 mmol/L    Anion Gap 8 3 - 14 mmol/L    Urea Nitrogen 16 7 - 30 mg/dL    Creatinine 1.31 (H) 0.66 - 1.25 mg/dL    Calcium 8.9 8.5 - 10.1 mg/dL    Glucose 132 (H) 70 - 99 mg/dL    Alkaline Phosphatase 76 40 - 150 U/L    AST 33 0 - 45 U/L    ALT 42 0 - 70 U/L    Protein Total 7.8 6.8 - 8.8 g/dL    Albumin 3.8 3.4 - 5.0 g/dL    Bilirubin Total 0.8 0.2 - 1.3 mg/dL    GFR Estimate 56 (L) >60 mL/min/1.73m2      Comment:      Effective December 21, 2021 eGFRcr in adults is calculated  using the 2021 CKD-EPI creatinine equation which includes age and gender (Reji kennedy al., [place], DOI: 10.1056/KZHDkj5159068)   UA with Microscopic reflex to Culture - lab collect   Result Value Ref Range    Color Urine Yellow Colorless, Straw, Light Yellow, Yellow    Appearance Urine Clear Clear    Glucose Urine 100 (A) Negative mg/dL    Bilirubin Urine Negative Negative    Ketones Urine Negative Negative mg/dL    Specific Gravity Urine >=1.030 1.003 - 1.035    Blood Urine Negative Negative    pH Urine 5.0 5.0 - 7.0    Protein Albumin Urine 100 (A) Negative mg/dL    Urobilinogen Urine 0.2 0.2, 1.0 E.U./dL    Nitrite Urine Negative Negative    Leukocyte Esterase Urine Negative Negative   Lipid panel reflex to direct LDL Fasting   Result Value Ref Range    Cholesterol 147 <200 mg/dL    Triglycerides 250 (H) <150 mg/dL    Direct Measure HDL 33 (L) >=40 mg/dL    LDL Cholesterol Calculated 64 <=100 mg/dL    Non HDL Cholesterol 114 <130 mg/dL    Patient Fasting > 8hrs? Yes     Narrative    Cholesterol  Desirable:  <200 mg/dL    Triglycerides  Normal:  Less than 150 mg/dL  Borderline High:  150-199 mg/dL  High:  200-499 mg/dL  Very High:  Greater than or equal to 500 mg/dL    Direct Measure HDL  Female:  Greater than or equal to 50 mg/dL   Male:  Greater than or equal to 40 mg/dL    LDL Cholesterol  Desirable:  <100mg/dL  Above Desirable:  100-129 mg/dL   Borderline High:  130-159 mg/dL   High:  160-189 mg/dL   Very High:  >= 190 mg/dL    Non HDL Cholesterol  Desirable:  130 mg/dL  Above Desirable:  130-159 mg/dL  Borderline High:  160-189 mg/dL  High:  190-219 mg/dL  Very High:  Greater than or equal to 220 mg/dL   PSA, screen   Result Value Ref Range    Prostate Specific Antigen Screen 7.25 (H) 0.00 - 4.00 ug/L   HEMOGLOBIN A1C   Result Value Ref Range    Hemoglobin A1C 7.0 (H) 0.0 - 5.6 %      Comment:      Normal <5.7%   Prediabetes 5.7-6.4%    Diabetes 6.5% or higher     Note: Adopted from ADA consensus guidelines.    Albumin Random Urine Quantitative with Creat Ratio   Result Value Ref Range    Creatinine Urine mg/dL 247 mg/dL    Albumin Urine mg/L 417 mg/L    Albumin Urine mg/g Cr 168.83 (H) 0.00 - 17.00 mg/g Cr   Hemoglobin   Result Value Ref Range    Hemoglobin 15.5 13.3 - 17.7 g/dL   Phosphorus   Result Value Ref Range    Phosphorus 3.0 2.5 - 4.5 mg/dL   Parathyroid Hormone Intact   Result Value Ref Range    Parathyroid Hormone Intact 33 15 - 65 pg/mL    Narrative    This result was obtained with the Roche Elecsys PTH STAT assay.   This reference range differs from PTH assays used in other [facility] laboratories.   Urine Microscopic   Result Value Ref Range    Bacteria Urine Few (A) None Seen /HPF    RBC Urine None Seen 0-2 /HPF /HPF    WBC Urine 0-5 0-5 /HPF /HPF    Squamous Epithelials Urine None Seen None Seen /LPF    Mucus Urine Present (A) None Seen /LPF    Narrative    Urine Culture not indicated     If you have any questions or concerns please feel free to contact me at the office at [phone] or via [employer].     Sincerely,  Yazmin Trivedi, DNP, APRN, CNP/kb

## 2022-10-12 NOTE — PATIENT INSTRUCTIONS
Consider the shingles vaccine, Shingrix.   Shingrix is given in a 2 shot series, between 2 and 6 months apart.  It is recommended for adults age 50 and older.  Initial studies have indicated that this vaccine is 85-90% effective at preventing shingles, and is preferred over the old Zostavax vaccine.  It may be best to get the vaccine/shot at a pharmacy because many insurances cover this particular vaccine/shot through the pharmacy benefits (not the clinic benefits).       Patient Education   Personalized Prevention Plan  You are due for the preventive services outlined below.  Your care team is available to assist you in scheduling these services.  If you have already completed any of these items, please share that information with your care team to update in your medical record.  Health Maintenance Due   Topic Date Due     Zoster (Shingles) Vaccine (2 of 3) 04/12/2011     Diptheria Tetanus Pertussis (DTAP/TDAP/TD) Vaccine (2 - Td or Tdap) 03/07/2018     Pneumococcal Vaccine (3 - PPSV23 if available, else PCV20) 07/09/2021     Eye Exam  09/15/2021     COVID-19 Vaccine (4 - Booster for Pfizer series) 03/21/2022     Flu Vaccine (1) 09/01/2022       Urinary Incontinence (Male)    Urinary incontinence means not being able to control the release of urine from the bladder.   Causes  Common causes of urinary incontinence in men include:    Infection    Certain medicines    Aging    Poor pelvic muscle tone    Bladder spasms    Obesity    Trouble urinating and fully emptying the bladder (urinary retention)  Other things that can cause incontinence are:     Nervous system diseases    Diabetes    Sleep apnea    Urinary tract infections    Prostate surgery    Pelvic injury  Constipation and smoking have also been identified as risk factors.   Symptoms    Urge incontinence (overactive bladder). This is a sudden urge to urinate. It occurs even though there may not be much urine in the bladder. The need to urinate often during the  night is common. It's due to bladder spasms.    Stress incontinence. This is urine leakage that you can't control. It can occur with sneezing, coughing, and other actions that put stress on the bladder.    Treatment  Treatment depends on what is causing the condition. Bladder infections are treated with antibiotics. Urinary retention is treated with a bladder catheter.   Home care  Follow these guidelines when caring for yourself at home:    Don't have any foods and drinks that may irritate the bladder. This includes:  ? Chocolate  ? Alcohol  ? Caffeine  ? Carbonated drinks  ? Acidic fruits and juices    Limit fluids to 6 to 8 cups a day.    Lose weight if you are overweight. This will reduce your symptoms.    If advised, do regular pelvic muscle-strengthening exercises such as Kegel exercises.    If needed, wear absorbent pads to catch urine. Change the pads often. This is for good hygiene and to prevent skin and bladder infections.    Bathe daily for good hygiene.    If an antibiotic was prescribed to treat a bladder infection, take it until it's finished. Keep taking it even if you are feeling better. This is to make sure your infection has cleared.    If a catheter was left in place, keep bacteria from getting into the collection bag. Don't disconnect the catheter from the collection bag.    Use a leg band to secure the catheter drainage tube, so it does not pull on the catheter. Drain the collection bag when it becomes full. To do this, use the drain spout at the bottom of the bag. Don't disconnect the bag from the catheter.    Don't pull on or try to remove a catheter. The catheter must be removed by a healthcare provider.    If you smoke, stop. Ask your provider for help if you can't do this on your own.  Follow-up care  Follow up with your healthcare provider, or as advised.  When to get medical advice  Call your healthcare provider right away if any of these occur:    Fever over 100.4 F (38 C), or as  directed by your provider    Bladder pain or fullness    Belly swelling, nausea, or vomiting    Back pain    Weakness, dizziness, or fainting    If a catheter was left in place, return if:  ? The catheter falls out  ? The catheter stops draining for 6 hours  ? Your urine gets cloudy or smells bad  Mary Beth last reviewed this educational content on 1/1/2020 2000-2021 [employer]. All rights reserved. This information is not intended as a substitute for professional medical care. Always follow your healthcare professional's instructions.

## 2022-10-12 NOTE — PROGRESS NOTES
"SUBJECTIVE:   Keyshawn is a 76 year old who presents for Preventive Visit.      Are you in the first 12 months of your Medicare coverage?  No    Healthy Habits:    In general, how would you rate your overall health?  Very good    Frequency of exercise:  2-3 days/week    Duration of exercise:  Greater than 60 minutes    Do you usually eat at least 4 servings of fruit and vegetables a day, include whole grains    & fiber and avoid regularly eating high fat or \"junk\" foods?  Yes    Taking medications regularly:  Yes    Barriers to taking medications:  Not applicable    Medication side effects:  Not applicable    Ability to successfully perform activities of daily living:  No assistance needed    Home Safety:  No safety concerns identified    Hearing Impairment:  No hearing concerns    In the past 6 months, have you been bothered by leaking of urine? Yes    In general, how would you rate your overall mental or emotional health?  Very good      PHQ-2 Total Score:    Additional concerns today:  No    Stopped smoking age 33 and had smoked for 10 years    Do you feel safe in your environment? YES    Have you ever done Advance Care Planning? (For example, a Health Directive, POLST, or a discussion with a medical provider or your loved ones about your wishes): No, advance care planning information given to patient to review.  Patient declined advance care planning discussion at this time.      Fall risk  Fallen 2 or more times in the past year?: No  Any fall with injury in the past year?: No    Cognitive Screening   1) Repeat 3 items (Leader, Season, Table)      2) Clock draw: NORMAL- hands are correct no numbers   3) 3 item recall: Recalls 1 object   Results: NORMAL clock, 1-2 items recalled: COGNITIVE IMPAIRMENT LESS LIKELY    Mini-CogTM Copyright LOU Harris. Licensed by the author for use in [facility]; reprinted with permission ([email or web address]). All rights reserved.      Do you have sleep apnea, excessive snoring " or daytime drowsiness?: drowsy at times watching TV    Reviewed and updated as needed this visit by clinical staff   Tobacco  Allergies  Meds  Problems  Med Hx  Surg Hx  Fam Hx          Reviewed and updated as needed this visit by Provider   Tobacco  Allergies  Meds  Problems  Med Hx  Surg Hx  Fam Hx         Social History     Tobacco Use     Smoking status: Former     Types: Cigarettes     Start date: 1968     Quit date: 1978     Years since quittin.8     Smokeless tobacco: Never   Substance Use Topics     Alcohol use: Not Currently     If you drink alcohol do you typically have >3 drinks per day or >7 drinks per week? No    No flowsheet data found.      Diabetes Follow-up      How often are you checking your blood sugar? Not at all    Have you had a diabetic eye exam in the last 12 months? No      BP Readings from Last 2 Encounters:   10/12/22 (!) 148/90   22 (!) 160/90     Hemoglobin A1C (%)   Date Value   10/12/2022 7.0 (H)   2022 6.5 (H)   2021 6.5 (H)     LDL Cholesterol Calculated (mg/dL)   Date Value   10/12/2022 64   2021 84   2020 82     LDL Cholesterol Direct (mg/dL)   Date Value   2013 107         Current providers sharing in care for this patient include:   Patient Care Team:  Yazmin Trivedi NP as PCP - General (Nurse Practitioner - Family)  Yazmin Trivedi NP as Assigned PCP  Sergey Herrera OD as MD (Optometrist)  Yazmin Trivedi NP as Referring Physician (Nurse Practitioner - Family)    The following health maintenance items are reviewed in Epic and correct as of today:  Health Maintenance   Topic Date Due     ZOSTER IMMUNIZATION (2 of 3) 2011     DTAP/TDAP/TD IMMUNIZATION (2 - Td or Tdap) 2018     Pneumococcal Vaccine: 65+ Years (3 - PPSV23 if available, else PCV20) 2021     EYE EXAM  09/15/2021     COVID-19 Vaccine (4 - Booster for Pfizer series) 2022     INFLUENZA VACCINE (1) 2022     ANNUAL  REVIEW OF HM ORDERS  2023     A1C  2023     MICROALBUMIN  2023     MEDICARE ANNUAL WELLNESS VISIT  10/12/2023     BMP  10/12/2023     LIPID  10/12/2023     DIABETIC FOOT EXAM  10/12/2023     FALL RISK ASSESSMENT  10/12/2023     HEMOGLOBIN  10/12/2023     ADVANCE CARE PLANNING  10/13/2027     PARATHYROID  Completed     PHOSPHORUS  Completed     HEPATITIS C SCREENING  Completed     PHQ-2 (once per calendar year)  Completed     URINALYSIS  Completed     ALK PHOS  Completed     IPV IMMUNIZATION  Aged Out     MENINGITIS IMMUNIZATION  Aged Out     BP Readings from Last 3 Encounters:   10/12/22 (!) 148/90   22 (!) 160/90   21 110/80    Wt Readings from Last 3 Encounters:   10/12/22 103.8 kg (228 lb 12.8 oz)   22 101.8 kg (224 lb 6.4 oz)   22 102.8 kg (226 lb 9.6 oz)                  Patient Active Problem List   Diagnosis     Elevated prostate specific antigen (PSA)     Essential hypertension     Obesity     Neoplasm of uncertain behavior of skin     NAFLD (nonalcoholic fatty liver disease)     Type 2 diabetes mellitus with hyperglycemia (H)     Vitamin D deficiency     Chronic kidney disease, stage 3b (H)     History reviewed. No pertinent surgical history.    Social History     Tobacco Use     Smoking status: Former     Types: Cigarettes     Start date: 1968     Quit date: 1978     Years since quittin.8     Smokeless tobacco: Never   Substance Use Topics     Alcohol use: Not Currently     History reviewed. No pertinent family history.      Current Outpatient Medications   Medication Sig Dispense Refill            aspirin (ASA) 81 MG EC tablet Take 81 mg by mouth       blood glucose (NO BRAND SPECIFIED) test strip Use to test blood sugar 1 times daily or as directed. To accompany: Blood Glucose Monitor Brands: per insurance. 100 strip 11     blood glucose monitoring (SOFTCLIX) lancets USE   TO CHECK GLUCOSE THREE TIMES DAILY DX  E11.9       Coenzyme Q10 (CO Q-10) 30  "MG CAPS Take 30 mg by mouth       dorzolamide-timolol (COSOPT) 2-0.5 % ophthalmic solution INSTILL 1 DROP INTO EACH EYE TWICE DAILY       ibuprofen (ADVIL/MOTRIN) 800 MG tablet        latanoprost (XALATAN) 0.005 % ophthalmic solution INSTILL 1 DROP INTO EACH EYE AT BEDTIME 7.5 mL 0     lisinopril (ZESTRIL) 10 MG tablet Take 1 tablet (10 mg) by mouth daily 90 tablet 3     meclizine (ANTIVERT) 25 MG tablet Take 1 tablet (25 mg) by mouth 3 times daily as needed for dizziness 30 tablet 1     metFORMIN (GLUCOPHAGE XR) 500 MG 24 hr tablet TAKE 2 TABLETS BY MOUTH ONCE DAILY WITH SUPPER 180 tablet 1     pravastatin (PRAVACHOL) 10 MG tablet Take 1 tablet (10 mg) by mouth At Bedtime 90 tablet 3     Allergies   Allergen Reactions     Cats      Dust Mites          Review of Systems  Constitutional, HEENT, cardiovascular, pulmonary, GI, , musculoskeletal, neuro, skin, endocrine and psych systems are negative, except as otherwise noted.    OBJECTIVE:   BP (!) 148/90 (BP Location: Left arm, Patient Position: Sitting)   Pulse 68   Temp 97.8  F (36.6  C) (Oral)   Resp 16   Ht 1.727 m (5' 8\")   Wt 103.8 kg (228 lb 12.8 oz)   SpO2 96%   BMI 34.79 kg/m   Estimated body mass index is 34.79 kg/m  as calculated from the following:    Height as of this encounter: 1.727 m (5' 8\").    Weight as of this encounter: 103.8 kg (228 lb 12.8 oz).  Physical Exam  GENERAL: healthy, alert, no distress and obese  EYES: Eyes grossly normal to inspection, PERRL and conjunctivae and sclerae normal  HENT: ear canals and TM's normal, nose and mouth without ulcers or lesions  NECK: no adenopathy  RESP: lungs clear to auscultation - no rales, rhonchi or wheezes  CV: regular rates and rhythm, normal S1 S2, no S3 or S4, no murmur, click or rub and no peripheral edema  ABDOMEN: bowel sounds normal and soft obese abdomen, ventral hernia  NEURO: Normal strength and tone, mentation intact and speech normal  PSYCH: mentation appears normal, affect " normal/bright    Diagnostic Test Results:  Labs reviewed in Epic    ASSESSMENT / PLAN:   Keyshawn was seen today for physical.    Diagnoses and all orders for this visit:    Routine history and physical examination of adult    Chronic kidney disease, stage 3b (H)  -     Parathyroid Hormone Intact; Future  -     Phosphorus; Future  -     Hemoglobin; Future  -     Albumin Random Urine Quantitative with Creat Ratio; Future  -     UA with Microscopic reflex to Culture - lab collect; Future  -     Cancel: Basic metabolic panel  (Ca, Cl, CO2, Creat, Gluc, K, Na, BUN); Future  -     Vitamin D Deficiency; Future  -     Vitamin D Deficiency  -     UA with Microscopic reflex to Culture - lab collect  -     Albumin Random Urine Quantitative with Creat Ratio  -     Hemoglobin  -     Phosphorus  -     Parathyroid Hormone Intact  -     Urine Microscopic    Allergic conjunctivitis, bilateral  Known issue that I take into account for their medical decisions, no current exacerbations or new concerns.    Type 2 diabetes mellitus without complication, without long-term current use of insulin (H)  Chronic, stable, continue current treatment.   -     HEMOGLOBIN A1C; Future  -     metFORMIN (GLUCOPHAGE XR) 500 MG 24 hr tablet; TAKE 2 TABLETS BY MOUTH ONCE DAILY WITH SUPPER  -     FOOT EXAM  -     blood glucose (NO BRAND SPECIFIED) test strip; Use to test blood sugar 1 times daily or as directed. To accompany: Blood Glucose Monitor Brands: per insurance.  -     Adult Eye  Referral; Future  -     Cancel: Basic metabolic panel  (Ca, Cl, CO2, Creat, Gluc, K, Na, BUN); Future  -     Comprehensive metabolic panel (BMP + Alb, Alk Phos, ALT, AST, Total. Bili, TP); Future  -     Comprehensive metabolic panel (BMP + Alb, Alk Phos, ALT, AST, Total. Bili, TP)  -     HEMOGLOBIN A1C    Hyperlipidemia LDL goal <100  Chronic, stable, continue current treatment.   -     pravastatin (PRAVACHOL) 10 MG tablet; Take 1 tablet (10 mg) by mouth At  "Bedtime  -     Lipid panel reflex to direct LDL Fasting; Future  -     Lipid panel reflex to direct LDL Fasting    Essential hypertension  Uncontrolled  -     Cancel: Basic metabolic panel  (Ca, Cl, CO2, Creat, Gluc, K, Na, BUN); Future  -     Comprehensive metabolic panel (BMP + Alb, Alk Phos, ALT, AST, Total. Bili, TP); Future  -     Stop Lisinopril 10 mg.  Start amLODIPine-benazepril (LOTREL) 5-10 MG capsule; Take 1 capsule by mouth daily . Patient is due for office visit and/or labs before further refills.  -     Comprehensive metabolic panel (BMP + Alb, Alk Phos, ALT, AST, Total. Bili, TP)  Follow-up in 4 weeks for recheck BP.    Elevated prostate specific antigen (PSA)  He was referred to Urology back in 5/2021 but never followed up.  I recommended to patient today to follow up with Urology for elevated PSA.  -     PSA, screen; Future  -     Adult Urology  Referral; Future  -     PSA, screen    NAFLD (nonalcoholic fatty liver disease)  -     Comprehensive metabolic panel (BMP + Alb, Alk Phos, ALT, AST, Total. Bili, TP); Future  -     Comprehensive metabolic panel (BMP + Alb, Alk Phos, ALT, AST, Total. Bili, TP)    Screening for prostate cancer  -     PSA, screen; Future  -     PSA, screen        COUNSELING:  Reviewed preventive health counseling, as reflected in patient instructions       Regular exercise       Healthy diet/nutrition       Prostate cancer screening    Estimated body mass index is 34.79 kg/m  as calculated from the following:    Height as of this encounter: 1.727 m (5' 8\").    Weight as of this encounter: 103.8 kg (228 lb 12.8 oz).    Weight management plan: Discussed healthy diet and exercise guidelines    He reports that he quit smoking about 44 years ago. His smoking use included cigarettes. He started smoking about 54 years ago. He has never used smokeless tobacco.      Appropriate preventive services were discussed with this patient, including applicable screening as appropriate " for cardiovascular disease, diabetes, osteopenia/osteoporosis, and glaucoma.  As appropriate for age/gender, discussed screening for colorectal cancer, prostate cancer, breast cancer, and cervical cancer. Checklist reviewing preventive services available has been given to the patient.    Reviewed patients plan of care and provided an AVS. The Intermediate Care Plan ( asthma action plan, low back pain action plan, and migraine action plan) for Keyshawn meets the Care Plan requirement. This Care Plan has been established and reviewed with the Patient.    Counseling Resources:  ATP IV Guidelines  Pooled Cohorts Equation Calculator  Breast Cancer Risk Calculator  Breast Cancer: Medication to Reduce Risk  FRAX Risk Assessment  ICSI Preventive Guidelines  Dietary Guidelines for Americans, 2010  [employer]'s MyPlate  ASA Prophylaxis  Lung CA Screening    Yazmin Trivedi NP  [facility]    Identified Health Risks:    Information on urinary incontinence and treatment options given to patient.

## 2022-11-09 ENCOUNTER — OFFICE VISIT (OUTPATIENT)
Dept: FAMILY MEDICINE | Facility: CLINIC | Age: 77
End: 2022-11-09
Payer: MEDICARE

## 2022-11-09 VITALS
TEMPERATURE: 98.3 F | OXYGEN SATURATION: 96 % | HEIGHT: 68 IN | HEART RATE: 80 BPM | WEIGHT: 228 LBS | BODY MASS INDEX: 34.56 KG/M2 | RESPIRATION RATE: 20 BRPM | SYSTOLIC BLOOD PRESSURE: 132 MMHG | DIASTOLIC BLOOD PRESSURE: 74 MMHG

## 2022-11-09 DIAGNOSIS — E11.65 TYPE 2 DIABETES MELLITUS WITH HYPERGLYCEMIA, WITHOUT LONG-TERM CURRENT USE OF INSULIN (H): ICD-10-CM

## 2022-11-09 DIAGNOSIS — I10 ESSENTIAL HYPERTENSION: ICD-10-CM

## 2022-11-09 DIAGNOSIS — K86.2 PANCREATIC CYST: ICD-10-CM

## 2022-11-09 DIAGNOSIS — Z09 HOSPITAL DISCHARGE FOLLOW-UP: Primary | ICD-10-CM

## 2022-11-09 DIAGNOSIS — K80.20 CALCULUS OF GALLBLADDER WITHOUT CHOLECYSTITIS WITHOUT OBSTRUCTION: ICD-10-CM

## 2022-11-09 LAB
ALBUMIN SERPL-MCNC: 3.9 G/DL (ref 3.4–5)
ALP SERPL-CCNC: 117 U/L (ref 40–150)
ALT SERPL W P-5'-P-CCNC: 88 U/L (ref 0–70)
ANION GAP SERPL CALCULATED.3IONS-SCNC: 7 MMOL/L (ref 3–14)
AST SERPL W P-5'-P-CCNC: 55 U/L (ref 0–45)
BILIRUB SERPL-MCNC: 0.7 MG/DL (ref 0.2–1.3)
BUN SERPL-MCNC: 14 MG/DL (ref 7–30)
CALCIUM SERPL-MCNC: 9.1 MG/DL (ref 8.5–10.1)
CHLORIDE BLD-SCNC: 104 MMOL/L (ref 94–109)
CO2 SERPL-SCNC: 24 MMOL/L (ref 20–32)
CREAT SERPL-MCNC: 1.21 MG/DL (ref 0.66–1.25)
GFR SERPL CREATININE-BSD FRML MDRD: 62 ML/MIN/1.73M2
GLUCOSE BLD-MCNC: 142 MG/DL (ref 70–99)
POTASSIUM BLD-SCNC: 4.3 MMOL/L (ref 3.4–5.3)
PROT SERPL-MCNC: 7.9 G/DL (ref 6.8–8.8)
SODIUM SERPL-SCNC: 135 MMOL/L (ref 133–144)

## 2022-11-09 PROCEDURE — 80053 COMPREHEN METABOLIC PANEL: CPT | Performed by: NURSE PRACTITIONER

## 2022-11-09 PROCEDURE — 36415 COLL VENOUS BLD VENIPUNCTURE: CPT | Performed by: NURSE PRACTITIONER

## 2022-11-09 PROCEDURE — 99214 OFFICE O/P EST MOD 30 MIN: CPT | Performed by: NURSE PRACTITIONER

## 2022-11-09 RX ORDER — AMLODIPINE AND BENAZEPRIL HYDROCHLORIDE 5; 10 MG/1; MG/1
1 CAPSULE ORAL DAILY
Qty: 90 CAPSULE | Refills: 3 | Status: SHIPPED | OUTPATIENT
Start: 2022-11-09 | End: 2023-05-18

## 2022-11-09 ASSESSMENT — PAIN SCALES - GENERAL: PAINLEVEL: NO PAIN (0)

## 2022-11-09 NOTE — PROGRESS NOTES
Assessment & Plan     Hospital discharge follow-up    Calculus of gallbladder without cholecystitis without obstruction  Patient declined surgery in the hospital and continues to decline.  If he has reoccurring pain he would reconsider this.    Pancreatic cyst  Discussed with patient need for follow-up MR Abdomen MRCP in 6 months to recheck pancreatic cyst.  Patient verbalized understanding.    Type 2 diabetes mellitus with hyperglycemia, without long-term current use of insulin (H)  Known issue that I take into account for their medical decisions, no current exacerbations or new concerns.     Essential hypertension  Chronic, stable, continue current treatment.   - amLODIPine-benazepril (LOTREL) 5-10 MG capsule; Take 1 capsule by mouth daily  - Comprehensive metabolic panel (BMP + Alb, Alk Phos, ALT, AST, Total. Bili, TP)           MED REC REQUIRED  Post Medication Reconciliation Status:       Return in about 5 months (around 4/9/2023) for Diabetes (go to Lab first).    Yazmin Trivedi NP  [facility]    Bjorn Mahajan is a 76 year old, presenting for the following health issues:  Hypertension and Hospital F/U      HPI     Hypertension Follow-up      Do you check your blood pressure regularly outside of the clinic? Yes     Are you following a low salt diet? Yes    Are your blood pressures ever more than 140 on the top number (systolic) OR more   than 90 on the bottom number (diastolic), for example 140/90? No      How many servings of fruits and vegetables do you eat daily?  0-1    On average, how many sweetened beverages do you drink each day (Examples: soda, juice, sweet tea, etc.  Do NOT count diet or artificially sweetened beverages)?   0    How many days per week do you exercise enough to make your heart beat faster? 3 or less    How many minutes a day do you exercise enough to make your heart beat faster? 60 or more    How many days per week do you miss taking your  "medication? 0      Hospital Follow-up Visit:    Hospital/Nursing Home/IP Rehab Facility: [facility]  Date of Admission: 11/02/2022  Date of Discharge: 11/4/2022  Reason(s) for Admission: Cholelithiasis. Abnormal liver function tests due to Suspected passed choledocholithiasis     Was your hospitalization related to COVID-19? No   Problems taking medications regularly:  None  Medication changes since discharge: None  Problems adhering to non-medication therapy:  None    Summary of hospitalization:  CareEverywhere information obtained and reviewed  Diagnostic Tests/Treatments reviewed.  Follow up needed: none  Other Healthcare Providers Involved in Patient s Care:         None  Update since discharge: improved.     Plan of care communicated with patient           \"My attitude in life is negative\"  He does not want surgery for gallbladder.  Says the pain has resolved.  If the pain returned then he would reconsider surgery.  Does not want to follow up with a general surgeon to discuss options.      Hospital discharge note copied here:    \"Kenny Potter is a 76 y.o. male with a history of DM II, HLD, HTN, hernia, hepatic steatosis who presents with abdominal pain.     Patient was in his normal state of health until the day prior to admission when he developed right upper quadrant abdominal pain associated with poor appetite but no nausea or vomiting or fever. fever No significant changes in bowel function; all though, no BM since pain started. Patient does not drink any EtOH.   In ED emergency room his vitals were stable and exam remarkable for reducible ventral hernia and minimal tenderness in the right mid quadrant. Laboratory work without leukocytosis but with elevated LFTs. Normal lipase. CT of the abdomen and pelvis was unremarkable and liver ultrasound did not show changes suggestive of cholecystitis. Common bile duct was not visualized.   The patient was admitted for further evaluation.  Gastroenterology " "consulted and [facility]p recommended . It showed gallstones , no gall bladder inflammation; liver function tests improved by next day, scenario consistent with passed stone ; surgery recommended but patient declines at this point wants to think about it.    Patient needs follow up mrcp in 6month for incidental pancreatic cyst on imaging \" Incidental tiny 0.3 similar cystic lesion in the pancreatic head\"          Review of Systems   Constitutional, HEENT, cardiovascular, pulmonary, gi and gu systems are negative, except as otherwise noted.      Objective    /74 (BP Location: Right arm, Patient Position: Sitting, Cuff Size: Adult Large)   Pulse 80   Temp 98.3  F (36.8  C) (Oral)   Resp 20   Ht 1.727 m (5' 8\")   Wt 103.4 kg (228 lb)   SpO2 96%   BMI 34.67 kg/m    Body mass index is 34.67 kg/m .  Physical Exam   GENERAL: healthy, alert, no distress and obese  RESP: lungs clear to auscultation - no rales, rhonchi or wheezes  CV: regular rates and rhythm, normal S1 S2, no S3 or S4 and no murmur, click or rub  ABDOMEN: bowel sounds normal and no tenderness, ventral hernia  PSYCH: mentation appears normal, affect normal/bright    MR Abdomen MRCP Liver 11/3/22:  Impression    1.  Cholelithiasis, but no acute inflammation, biliary dilation, or choledocholithiasis.   2.  Incidental tiny 0.3 similar cystic lesion in the pancreatic head. Recommendations below:   REFERENCE:   Revisions of international consensus [place] guidelines for the management of IPMN of the pancreas. Pancreatology 2017;17(5):738-753.     Largest cyst less than 10 mm: CT or MRI/MRCP in 6 months and then every 2 years if no change.     3.  Hepatic steatosis.              "

## 2022-11-09 NOTE — LETTER
November 14, 2022      Keyshawn Potter  [address] [facility] [address]        Dear Keyshawn,     Your liver enzymes are mildly elevated but improved compared to when you were in the hospital.   Recommend following up in 2-3 months to recheck liver enzymes again.     If you have any questions or concerns please feel free to contact me at the office at [phone] or via "[employer]hart.     Yazmin Trivedi, DNP, APRN, CNP       Resulted Orders   Comprehensive metabolic panel (BMP + Alb, Alk Phos, ALT, AST, Total. Bili, TP)   Result Value Ref Range    Sodium 135 133 - 144 mmol/L    Potassium 4.3 3.4 - 5.3 mmol/L    Chloride 104 94 - 109 mmol/L    Carbon Dioxide (CO2) 24 20 - 32 mmol/L    Anion Gap 7 3 - 14 mmol/L    Urea Nitrogen 14 7 - 30 mg/dL    Creatinine 1.21 0.66 - 1.25 mg/dL    Calcium 9.1 8.5 - 10.1 mg/dL    Glucose 142 (H) 70 - 99 mg/dL    Alkaline Phosphatase 117 40 - 150 U/L    AST 55 (H) 0 - 45 U/L    ALT 88 (H) 0 - 70 U/L    Protein Total 7.9 6.8 - 8.8 g/dL    Albumin 3.9 3.4 - 5.0 g/dL    Bilirubin Total 0.7 0.2 - 1.3 mg/dL    GFR Estimate 62 >60 mL/min/1.73m2      Comment:      Effective December 21, 2021 eGFRcr in adults is calculated using the 2021 CKD-EPI creatinine equation which includes age and gender (Reji kennedy al., NEJM, DOI: 10.1056/LREYjy9539503)

## 2023-04-05 ENCOUNTER — TELEPHONE (OUTPATIENT)
Dept: FAMILY MEDICINE | Facility: CLINIC | Age: 78
End: 2023-04-05
Payer: COMMERCIAL

## 2023-04-05 DIAGNOSIS — E11.9 TYPE 2 DIABETES MELLITUS WITHOUT COMPLICATION, WITHOUT LONG-TERM CURRENT USE OF INSULIN (H): ICD-10-CM

## 2023-04-06 RX ORDER — METFORMIN HCL 500 MG
TABLET, EXTENDED RELEASE 24 HR ORAL
Qty: 180 TABLET | Refills: 0 | Status: SHIPPED | OUTPATIENT
Start: 2023-04-06 | End: 2023-05-18

## 2023-04-11 NOTE — TELEPHONE ENCOUNTER
Fina refill sent.    Patient is due for diabetes visit- please contact for scheduling.    Yazmin Trivedi, MICHAEL, APRN, CNP

## 2023-04-11 NOTE — TELEPHONE ENCOUNTER
TC-called to help assist on making appt, no answer voicemail message left to call clinic to make appt.        Nirmala Berman    [facility]

## 2023-05-18 ENCOUNTER — OFFICE VISIT (OUTPATIENT)
Dept: FAMILY MEDICINE | Facility: CLINIC | Age: 78
End: 2023-05-18
Payer: COMMERCIAL

## 2023-05-18 VITALS
OXYGEN SATURATION: 95 % | TEMPERATURE: 97.8 F | BODY MASS INDEX: 34.25 KG/M2 | WEIGHT: 226 LBS | SYSTOLIC BLOOD PRESSURE: 130 MMHG | DIASTOLIC BLOOD PRESSURE: 80 MMHG | HEART RATE: 71 BPM | RESPIRATION RATE: 16 BRPM | HEIGHT: 68 IN

## 2023-05-18 DIAGNOSIS — H10.13 ALLERGIC CONJUNCTIVITIS, BILATERAL: ICD-10-CM

## 2023-05-18 DIAGNOSIS — E66.01 MORBID OBESITY (H): ICD-10-CM

## 2023-05-18 DIAGNOSIS — I10 ESSENTIAL HYPERTENSION: ICD-10-CM

## 2023-05-18 DIAGNOSIS — H40.9 GLAUCOMA, UNSPECIFIED GLAUCOMA TYPE, UNSPECIFIED LATERALITY: ICD-10-CM

## 2023-05-18 DIAGNOSIS — K86.2 PANCREATIC CYST: ICD-10-CM

## 2023-05-18 DIAGNOSIS — K76.0 NAFLD (NONALCOHOLIC FATTY LIVER DISEASE): ICD-10-CM

## 2023-05-18 DIAGNOSIS — R97.20 ELEVATED PROSTATE SPECIFIC ANTIGEN (PSA): ICD-10-CM

## 2023-05-18 DIAGNOSIS — N18.32 CHRONIC KIDNEY DISEASE, STAGE 3B (H): ICD-10-CM

## 2023-05-18 DIAGNOSIS — E11.65 TYPE 2 DIABETES MELLITUS WITH HYPERGLYCEMIA, WITHOUT LONG-TERM CURRENT USE OF INSULIN (H): Primary | ICD-10-CM

## 2023-05-18 DIAGNOSIS — Z12.5 SCREENING FOR PROSTATE CANCER: ICD-10-CM

## 2023-05-18 DIAGNOSIS — Z28.21 TETANUS, DIPHTHERIA, AND ACELLULAR PERTUSSIS (TDAP) VACCINATION DECLINED: ICD-10-CM

## 2023-05-18 LAB — HBA1C MFR BLD: 7.3 % (ref 0–5.6)

## 2023-05-18 PROCEDURE — 36415 COLL VENOUS BLD VENIPUNCTURE: CPT | Performed by: NURSE PRACTITIONER

## 2023-05-18 PROCEDURE — 82043 UR ALBUMIN QUANTITATIVE: CPT | Performed by: NURSE PRACTITIONER

## 2023-05-18 PROCEDURE — 80053 COMPREHEN METABOLIC PANEL: CPT | Performed by: NURSE PRACTITIONER

## 2023-05-18 PROCEDURE — 99214 OFFICE O/P EST MOD 30 MIN: CPT | Performed by: NURSE PRACTITIONER

## 2023-05-18 PROCEDURE — 83036 HEMOGLOBIN GLYCOSYLATED A1C: CPT | Performed by: NURSE PRACTITIONER

## 2023-05-18 PROCEDURE — 84153 ASSAY OF PSA TOTAL: CPT | Performed by: NURSE PRACTITIONER

## 2023-05-18 PROCEDURE — 82570 ASSAY OF URINE CREATININE: CPT | Performed by: NURSE PRACTITIONER

## 2023-05-18 RX ORDER — AMLODIPINE AND BENAZEPRIL HYDROCHLORIDE 5; 10 MG/1; MG/1
1 CAPSULE ORAL DAILY
Qty: 90 CAPSULE | Refills: 3 | Status: SHIPPED | OUTPATIENT
Start: 2023-05-18 | End: 2024-06-07

## 2023-05-18 RX ORDER — METFORMIN HCL 500 MG
TABLET, EXTENDED RELEASE 24 HR ORAL
Qty: 180 TABLET | Refills: 1 | Status: SHIPPED | OUTPATIENT
Start: 2023-05-18 | End: 2023-12-11

## 2023-05-18 RX ORDER — DORZOLAMIDE HYDROCHLORIDE AND TIMOLOL MALEATE 20; 5 MG/ML; MG/ML
1 SOLUTION/ DROPS OPHTHALMIC 2 TIMES DAILY
Qty: 10 ML | Refills: 0 | Status: SHIPPED | OUTPATIENT
Start: 2023-05-18 | End: 2023-06-12

## 2023-05-18 ASSESSMENT — PAIN SCALES - GENERAL: PAINLEVEL: MODERATE PAIN (5)

## 2023-05-18 NOTE — PROGRESS NOTES
Assessment & Plan     Type 2 diabetes mellitus with hyperglycemia, without long-term current use of insulin (H)  Chronic, stable, continue current treatment.   - Adult Eye  Referral; Future  - HEMOGLOBIN A1C; Future  - metFORMIN (GLUCOPHAGE XR) 500 MG 24 hr tablet; TAKE 2 TABLETS BY MOUTH ONCE DAILY WITH SUPPER  - HEMOGLOBIN A1C  - Albumin Random Urine Quantitative with Creat Ratio  - Comprehensive metabolic panel (BMP + Alb, Alk Phos, ALT, AST, Total. Bili, TP)    Essential hypertension  Chronic, stable, continue current treatment.   - amLODIPine-benazepril (LOTREL) 5-10 MG capsule; Take 1 capsule by mouth daily  - Comprehensive metabolic panel (BMP + Alb, Alk Phos, ALT, AST, Total. Bili, TP); Future  - Comprehensive metabolic panel (BMP + Alb, Alk Phos, ALT, AST, Total. Bili, TP)    NAFLD (nonalcoholic fatty liver disease)  monitor  - Comprehensive metabolic panel (BMP + Alb, Alk Phos, ALT, AST, Total. Bili, TP)    Pancreatic cyst  He is due for 6 months follow-up of pancreatic cyst with:  - MR Abdomen MRCP w/o & w Contrast; Future    Tetanus, diphtheria, and acellular pertussis (Tdap) vaccination declined    Chronic kidney disease, stage 3b (H)    - Albumin Random Urine Quantitative with Creat Ratio; Future  - Albumin Random Urine Quantitative with Creat Ratio    Morbid obesity (H)    Elevated prostate specific antigen (PSA)  I again recommended that patient follows up with urology, he had no showed urology visit 11/2/2022  - PSA, screen; Future  - PSA, screen    Screening for prostate cancer    - PSA, screen; Future  - PSA, screen    Glaucoma, unspecified glaucoma type, unspecified laterality    - dorzolamide-timolol (COSOPT) 2-0.5 % ophthalmic solution; Place 1 drop into both eyes 2 times daily .  Refills to be done by eye doctor  - Adult Eye  Referral; Future             BMI:   Estimated body mass index is 34.36 kg/m  as calculated from the following:    Height as of this encounter: 1.727 m  "(5' 8\").    Weight as of this encounter: 102.5 kg (226 lb).   Weight management plan: Discussed healthy diet and exercise guidelines        Yazmin Trivedi NP  [facility]    Bjorn Mahajan is a 77 year old, presenting for the following health issues:  Hypertension and Diabetes        5/18/2023     9:19 AM   Additional Questions   Roomed by Keena Jacobs     History of Present Illness       Diabetes:   He presents for follow up of diabetes.  He is checking home blood glucose a few times a month. He checks blood glucose before meals.  Blood glucose is never over 200 and never under 70. When his blood glucose is low, the patient is asymptomatic for confusion, blurred vision, lethargy and reports not feeling dizzy, shaky, or weak.  He has no concerns regarding his diabetes at this time.  He is not experiencing numbness or burning in feet, excessive thirst, blurry vision, weight changes or redness, sores or blisters on feet. The patient has not had a diabetic eye exam in the last 12 months.         Hypertension: He presents for follow up of hypertension.  He does not check blood pressure  regularly outside of the clinic. Outpatient blood pressures have not been over 140/90. He does not follow a low salt diet.     He eats 2-3 servings of fruits and vegetables daily.He consumes 0 sweetened beverage(s) daily.He exercises with enough effort to increase his heart rate 60 or more minutes per day.  He exercises with enough effort to increase his heart rate 5 days per week.   He is taking medications regularly.     Glucose 115 in the morning.        Review of Systems   Constitutional, HEENT, cardiovascular, pulmonary, gi and gu systems are negative, except as otherwise noted.      Objective    /80 (BP Location: Right arm, Patient Position: Sitting, Cuff Size: Adult Large)   Pulse 71   Temp 97.8  F (36.6  C) (Oral)   Resp 16   Ht 1.727 m (5' 8\")   Wt 102.5 kg (226 lb)   SpO2 95%   BMI " 34.36 kg/m    Body mass index is 34.36 kg/m .  Physical Exam   GENERAL: healthy, alert and no distress  RESP: lungs clear to auscultation - no rales, rhonchi or wheezes  CV: regular rates and rhythm, normal S1 S2, no S3 or S4 and no murmur, click or rub  PSYCH: mentation appears normal, affect normal/bright    Results for orders placed or performed in visit on 05/18/23   HEMOGLOBIN A1C     Status: Abnormal   Result Value Ref Range    Hemoglobin A1C 7.3 (H) 0.0 - 5.6 %   Albumin Random Urine Quantitative with Creat Ratio     Status: Abnormal   Result Value Ref Range    Creatinine Urine mg/dL 174.0 mg/dL    Albumin Urine mg/L 140.0 mg/L    Albumin Urine mg/g Cr 80.46 (H) 0.00 - 17.00 mg/g Cr   Comprehensive metabolic panel (BMP + Alb, Alk Phos, ALT, AST, Total. Bili, TP)     Status: Abnormal   Result Value Ref Range    Sodium 136 136 - 145 mmol/L    Potassium 4.5 3.4 - 5.3 mmol/L    Chloride 102 98 - 107 mmol/L    Carbon Dioxide (CO2) 23 22 - 29 mmol/L    Anion Gap 11 7 - 15 mmol/L    Urea Nitrogen 16.4 8.0 - 23.0 mg/dL    Creatinine 1.35 (H) 0.67 - 1.17 mg/dL    Calcium 9.2 8.8 - 10.2 mg/dL    Glucose 142 (H) 70 - 99 mg/dL    Alkaline Phosphatase 74 40 - 129 U/L    AST 27 10 - 50 U/L    ALT 20 10 - 50 U/L    Protein Total 7.5 6.4 - 8.3 g/dL    Albumin 4.5 3.5 - 5.2 g/dL    Bilirubin Total 0.6 <=1.2 mg/dL    GFR Estimate 54 (L) >60 mL/min/1.73m2   PSA, screen     Status: Abnormal   Result Value Ref Range    Prostate Specific Antigen Screen 7.36 (H) 0.00 - 6.50 ng/mL    Narrative    This result is obtained using the Roche Elecsys total PSA method on the sienna e801 immunoassay analyzer. Results obtained with different assay methods or kits cannot be used interchangeably.

## 2023-05-19 LAB
ALBUMIN SERPL BCG-MCNC: 4.5 G/DL (ref 3.5–5.2)
ALP SERPL-CCNC: 74 U/L (ref 40–129)
ALT SERPL W P-5'-P-CCNC: 20 U/L (ref 10–50)
ANION GAP SERPL CALCULATED.3IONS-SCNC: 11 MMOL/L (ref 7–15)
AST SERPL W P-5'-P-CCNC: 27 U/L (ref 10–50)
BILIRUB SERPL-MCNC: 0.6 MG/DL
BUN SERPL-MCNC: 16.4 MG/DL (ref 8–23)
CALCIUM SERPL-MCNC: 9.2 MG/DL (ref 8.8–10.2)
CHLORIDE SERPL-SCNC: 102 MMOL/L (ref 98–107)
CREAT SERPL-MCNC: 1.35 MG/DL (ref 0.67–1.17)
CREAT UR-MCNC: 174 MG/DL
DEPRECATED HCO3 PLAS-SCNC: 23 MMOL/L (ref 22–29)
GFR SERPL CREATININE-BSD FRML MDRD: 54 ML/MIN/1.73M2
GLUCOSE SERPL-MCNC: 142 MG/DL (ref 70–99)
MICROALBUMIN UR-MCNC: 140 MG/L
MICROALBUMIN/CREAT UR: 80.46 MG/G CR (ref 0–17)
POTASSIUM SERPL-SCNC: 4.5 MMOL/L (ref 3.4–5.3)
PROT SERPL-MCNC: 7.5 G/DL (ref 6.4–8.3)
PSA SERPL DL<=0.01 NG/ML-MCNC: 7.36 NG/ML (ref 0–6.5)
SODIUM SERPL-SCNC: 136 MMOL/L (ref 136–145)

## 2023-05-22 ENCOUNTER — TELEPHONE (OUTPATIENT)
Dept: FAMILY MEDICINE | Facility: CLINIC | Age: 78
End: 2023-05-22
Payer: COMMERCIAL

## 2023-05-22 NOTE — TELEPHONE ENCOUNTER
Attempt #1 to call patient.     RN left voicemail and requested return call to [facility] at [phone].     Lakesha Mejía RN  [facility] [place]

## 2023-05-22 NOTE — TELEPHONE ENCOUNTER
----- Message from Yazmin Trivedi NP sent at 5/19/2023  8:09 PM CDT -----  Please call patient to let him know that his PSA has risen-I had previously referred him to urology last fall and I continue to recommend that he sees urology for elevated PSA/concern for prostate cancer.  He can call them directly to schedule [phone].  His kidney function has decreased again, but still within the chronic kidney disease range where he has been.  Recommend working on healthy nutrition, maintaining a good blood pressure, and monitoring blood sugars.  Hemoglobin A1c has risen a bit-but still okay no need for change in medication.    Yazmin Trivedi, DNP, APRN, CNP

## 2023-05-22 NOTE — TELEPHONE ENCOUNTER
Pt calling back    RN relayed provider message    Patient verbalized understanding and in agreement with plan of care.     Lakesha Mejía RN

## 2023-05-25 ENCOUNTER — NURSE TRIAGE (OUTPATIENT)
Dept: NURSING | Facility: CLINIC | Age: 78
End: 2023-05-25

## 2023-05-25 ENCOUNTER — VIRTUAL VISIT (OUTPATIENT)
Dept: FAMILY MEDICINE | Facility: CLINIC | Age: 78
End: 2023-05-25
Payer: COMMERCIAL

## 2023-05-25 DIAGNOSIS — R05.9 COUGH, UNSPECIFIED TYPE: Primary | ICD-10-CM

## 2023-05-25 DIAGNOSIS — J02.9 SORE THROAT: ICD-10-CM

## 2023-05-25 DIAGNOSIS — R09.81 NASAL CONGESTION: ICD-10-CM

## 2023-05-25 PROCEDURE — 99442 PR PHYSICIAN TELEPHONE EVALUATION 11-20 MIN: CPT | Mod: 95 | Performed by: FAMILY MEDICINE

## 2023-05-25 RX ORDER — FLUTICASONE PROPIONATE 50 MCG
1 SPRAY, SUSPENSION (ML) NASAL DAILY
Qty: 16 G | Refills: 0 | Status: SHIPPED | OUTPATIENT
Start: 2023-05-25 | End: 2024-09-06

## 2023-05-25 NOTE — TELEPHONE ENCOUNTER
Nurse Triage SBAR    Is this a 2nd Level Triage? YES, LICENSED PRACTITIONER REVIEW IS REQUIRED    Situation: Cough, nasal congestion, sore throat for past three days. Denies shortness of breath.U nsure if he's had or has a fever  Mucus waking him not sleeping well.    Background: Unsure if he has been exposed to covid. Close friend has same symptoms.    Assessment: Symptoms need to be addressed. High risk due to age, htn, obesity, diabetes.    Protocol Recommended Disposition:   Call back by clinic with disposition.    Recommendation: Call patient and direct as to next step.     Routed to provider     Patient waiting for call back from clinic.    Does the patient meet one of the following criteria for ADS visit consideration? 16+ years old, with an [facility] PCP     TIP  Providers, please consider if this condition is appropriate for management at one of our Acute and Diagnostic Services sites.     If patient is a good candidate, please use dotphrase <dot>triageresponse and select Refer to ADS to document.    Covid symptoms.  Hasn't been tested for covid.    Sore throat  Cough  Nasal congestion  No thermometer. Doesn't know if he has a fever.    Reason for Disposition    [1] HIGH RISK for severe COVID complications (e.g., weak immune system, age > 64 years, obesity with BMI of 30 or higher, pregnant, chronic lung disease or other chronic medical condition) AND [2] COVID symptoms (e.g., cough, fever)  (Exceptions: Already seen by PCP and no new or worsening symptoms.)    Additional Information    Negative: SEVERE difficulty breathing (e.g., struggling for each breath, speaks in single words)    Negative: Difficult to awaken or acting confused (e.g., disoriented, slurred speech)    Negative: Bluish (or gray) lips or face now    Negative: Shock suspected (e.g., cold/pale/clammy skin, too weak to stand, low BP, rapid pulse)    Negative: Sounds like a life-threatening emergency to the triager    Negative: SEVERE or constant  chest pain or pressure  (Exception: Mild central chest pain, present only when coughing.)    Negative: MODERATE difficulty breathing (e.g., speaks in phrases, SOB even at rest, pulse 100-120)    Negative: Headache and stiff neck (can't touch chin to chest)    Negative: Oxygen level (e.g., pulse oximetry) 90 percent or lower    Negative: Chest pain or pressure  (Exception: MILD central chest pain, present only when coughing)    Negative: Patient sounds very sick or weak to the triager    Negative: MILD difficulty breathing (e.g., minimal/no SOB at rest, SOB with walking, pulse <100)    Negative: Fever > 103 F (39.4 C)    Negative: [1] Fever > 101 F (38.3 C) AND [2] over 60 years of age    Negative: [1] Fever > 100.0 F (37.8 C) AND [2] bedridden (e.g., nursing home patient, CVA, chronic illness, recovering from surgery)    Protocols used: CORONAVIRUS (COVID-19) DIAGNOSED OR [id number] CANO RN [facility]

## 2023-05-25 NOTE — TELEPHONE ENCOUNTER
Discussed with available provider- ok for virtual, if needed pt to come in for labs.    Discuss with patient and pt agreeable.      Apt made for 5/25 12:40      Sharon Duron RN

## 2023-05-25 NOTE — PROGRESS NOTES
Keyshawn is a 77 year old who is being evaluated via a billable telephone visit.      What phone number would you like to be contacted at? [phone]  How would you like to obtain your AVS? Mail a copy  Distant Location (provider location):  On-site      ICD-10-CM    1. Cough, unspecified type  R05.9 Symptomatic COVID-19 Virus (Coronavirus) by PCR     fluticasone (FLONASE) 50 MCG/ACT nasal spray      2. Nasal congestion  R09.81 Symptomatic COVID-19 Virus (Coronavirus) by PCR     fluticasone (FLONASE) 50 MCG/ACT nasal spray      3. Sore throat  J02.9 Streptococcus A Rapid Screen w/Reflex to PCR - Clinic Collect     fluticasone (FLONASE) 50 MCG/ACT nasal spray        Patient with 3 days of sore throat cough nasal congestion-he has had a friend with similar illness.  He is up-to-date on his COVID and flu vaccine.  He denies any pulmonary history or is not a smoker.  He denies any chest pain wheezing or shortness of breath.  Strongly advise getting tested for COVID and strep.  Use saline spray and Flonase and potentially add antihistamine if needed..  Follow-up if worsening in the office for recheck.  Patient is to call and make a lab only appointment to get his testings done today.    Bjorn Mahajan is a 77 year old, presenting for the following health issues:  URI        5/25/2023    10:20 AM   Additional Questions   Roomed by jewels MATHEW       COVID-19 Symptom Review  How many days ago did these symptoms start? 3 days    Are any of the following symptoms significant for you?    New or worsening difficulty breathing? No    Worsening cough? Yes, I am coughing up mucus.    Fever or chills? I do not know    Headache: No    Sore throat: YES    Chest pain: No    Diarrhea: No    Body aches? No    What treatments has patient tried? Acetaminophen   Does patient live in a nursing home, group home, or shelter? No  Does patient have a way to get food/medications during quarantined? Yes, I have a friend or family member  who can help me.    friend with similar sx, no fever  No sob, quit 33 years ago, no inhlaers, no known lung dz    covid shot done            Review of Systems   Constitutional, HEENT, cardiovascular, pulmonary, GI, , musculoskeletal, neuro, skin, endocrine and psych systems are negative, except as otherwise noted.      Objective           Vitals:  No vitals were obtained today due to virtual visit.    Physical Exam   healthy, alert and no distress  PSYCH: Alert and oriented times 3; coherent speech, normal   rate and volume, able to articulate logical thoughts, able   to abstract reason, no tangential thoughts, no hallucinations   or delusions  His affect is normal  RESP: No cough, no audible wheezing, able to talk in full sentences  Remainder of exam unable to be completed due to telephone visits          Phone call duration: 12minutes

## 2023-05-26 ENCOUNTER — ALLIED HEALTH/NURSE VISIT (OUTPATIENT)
Dept: FAMILY MEDICINE | Facility: CLINIC | Age: 78
End: 2023-05-26
Payer: COMMERCIAL

## 2023-05-26 DIAGNOSIS — R05.9 COUGH, UNSPECIFIED TYPE: Primary | ICD-10-CM

## 2023-05-26 LAB — DEPRECATED S PYO AG THROAT QL EIA: NEGATIVE

## 2023-05-26 PROCEDURE — 99207 PR NO CHARGE NURSE ONLY: CPT

## 2023-05-26 PROCEDURE — 87635 SARS-COV-2 COVID-19 AMP PRB: CPT | Performed by: FAMILY MEDICINE

## 2023-05-26 PROCEDURE — 87651 STREP A DNA AMP PROBE: CPT | Performed by: FAMILY MEDICINE

## 2023-05-27 LAB
GROUP A STREP BY PCR: NOT DETECTED
SARS-COV-2 RNA RESP QL NAA+PROBE: NEGATIVE

## 2023-05-30 ENCOUNTER — TELEPHONE (OUTPATIENT)
Dept: FAMILY MEDICINE | Facility: CLINIC | Age: 78
End: 2023-05-30
Payer: COMMERCIAL

## 2023-05-30 NOTE — LETTER
Keyshawn Potter  [address] [facility] [address]            Ayaz Mahajan,     We are sending you a letter per your request;     Your PSA has risen- your provider had previously referred him to urology last fall and she continues to recommend that you sees urology for elevated PSA/concern for prostate cancer. You can call them directly to schedule [phone].  Your kidney function has decreased again, but still within the chronic kidney disease range where you have been.  Your provider recommends working on healthy nutrition, maintaining a good blood pressure, and monitoring blood sugars.  Hemoglobin A1c has risen a bit-but still okay no need for change in medication.     Yazmin Trivedi, DNP, APRN, CNP       Component      Latest Ref Rng 5/18/2023  10:30 AM   Sodium      136 - 145 mmol/L 136    Potassium      3.4 - 5.3 mmol/L 4.5    Chloride      98 - 107 mmol/L 102    Carbon Dioxide (CO2)      22 - 29 mmol/L 23    Anion Gap      7 - 15 mmol/L 11    Urea Nitrogen      8.0 - 23.0 mg/dL 16.4    Creatinine      0.67 - 1.17 mg/dL 1.35 (H)    Calcium      8.8 - 10.2 mg/dL 9.2    Glucose      70 - 99 mg/dL 142 (H)    Alkaline Phosphatase      40 - 129 U/L 74    AST      10 - 50 U/L 27    ALT      10 - 50 U/L 20    Protein Total      6.4 - 8.3 g/dL 7.5    Albumin      3.5 - 5.2 g/dL 4.5    Bilirubin Total      <=1.2 mg/dL 0.6    GFR Estimate      >60 mL/min/1.73m2 54 (L)    Creatinine Urine      mg/dL 174.0    Albumin Urine mg/L      mg/L 140.0    Albumin Urine mg/g Cr      0.00 - 17.00 mg/g Cr 80.46 (H)    Hemoglobin A1C      0.0 - 5.6 % 7.3 (H) (C)   PSA Tumor Marker      0.00 - 6.50 ng/mL 7.36 (H)       Legend:  (H) High  (L) Low  (C) Corrected          Sincerely,     [facility]

## 2023-05-30 NOTE — TELEPHONE ENCOUNTER
Pt calling wanting result sent to him.    RN printed results and in outgoing mail.      Lakesha Mejía RN

## 2023-06-01 NOTE — RESULT ENCOUNTER NOTE
All your results are essentially snehal. Please contact me if you have any questions.  Take care,  Endy Gallagher D.O.

## 2023-06-12 ENCOUNTER — OFFICE VISIT (OUTPATIENT)
Dept: OPHTHALMOLOGY | Facility: CLINIC | Age: 78
End: 2023-06-12
Payer: COMMERCIAL

## 2023-06-12 DIAGNOSIS — Z01.01 ENCOUNTER FOR EXAMINATION OF EYES AND VISION WITH ABNORMAL FINDINGS: Primary | ICD-10-CM

## 2023-06-12 DIAGNOSIS — H40.1123 PRIMARY OPEN ANGLE GLAUCOMA (POAG) OF LEFT EYE, SEVERE STAGE: ICD-10-CM

## 2023-06-12 DIAGNOSIS — H40.1111 PRIMARY OPEN ANGLE GLAUCOMA (POAG) OF RIGHT EYE, MILD STAGE: ICD-10-CM

## 2023-06-12 DIAGNOSIS — H25.813 COMBINED FORM OF AGE-RELATED CATARACT, BOTH EYES: ICD-10-CM

## 2023-06-12 DIAGNOSIS — E11.65 TYPE 2 DIABETES MELLITUS WITH HYPERGLYCEMIA, WITHOUT LONG-TERM CURRENT USE OF INSULIN (H): ICD-10-CM

## 2023-06-12 DIAGNOSIS — Z98.890 HISTORY OF YAG LASER IRIDOTOMY OF BOTH EYES: ICD-10-CM

## 2023-06-12 DIAGNOSIS — H52.4 PRESBYOPIA: ICD-10-CM

## 2023-06-12 PROCEDURE — 92004 COMPRE OPH EXAM NEW PT 1/>: CPT | Performed by: OPHTHALMOLOGY

## 2023-06-12 PROCEDURE — 92015 DETERMINE REFRACTIVE STATE: CPT | Performed by: OPHTHALMOLOGY

## 2023-06-12 PROCEDURE — 92133 CPTRZD OPH DX IMG PST SGM ON: CPT | Performed by: OPHTHALMOLOGY

## 2023-06-12 RX ORDER — LATANOPROST 50 UG/ML
1 SOLUTION/ DROPS OPHTHALMIC EVERY EVENING
Qty: 2.5 ML | Refills: 11 | Status: SHIPPED | OUTPATIENT
Start: 2023-06-12 | End: 2024-04-02

## 2023-06-12 RX ORDER — DORZOLAMIDE HYDROCHLORIDE AND TIMOLOL MALEATE 20; 5 MG/ML; MG/ML
1 SOLUTION/ DROPS OPHTHALMIC 2 TIMES DAILY
Qty: 20 ML | Refills: 3 | Status: SHIPPED | OUTPATIENT
Start: 2023-06-12 | End: 2024-04-01

## 2023-06-12 ASSESSMENT — VISUAL ACUITY
OD_SC: J3
OD_PH_SC: 20/30
OS_PH_SC: 20/25
METHOD: SNELLEN - LINEAR
OD_SC+: -2
OS_SC+: -2
OS_SC: 20/40
OD_SC: 20/40
OS_SC: J16-
OS_PH_SC+: -2

## 2023-06-12 ASSESSMENT — SLIT LAMP EXAM - LIDS
COMMENTS: 2+ DERMATOCHALASIS
COMMENTS: 2+ DERMATOCHALASIS

## 2023-06-12 ASSESSMENT — CONF VISUAL FIELD
OS_NORMAL: 1
OS_SUPERIOR_NASAL_RESTRICTION: 0
OD_INFERIOR_NASAL_RESTRICTION: 0
OS_SUPERIOR_TEMPORAL_RESTRICTION: 0
OS_INFERIOR_TEMPORAL_RESTRICTION: 0
OD_SUPERIOR_NASAL_RESTRICTION: 0
OD_NORMAL: 1
OD_INFERIOR_TEMPORAL_RESTRICTION: 0
OD_SUPERIOR_TEMPORAL_RESTRICTION: 0
OS_INFERIOR_NASAL_RESTRICTION: 0

## 2023-06-12 ASSESSMENT — REFRACTION_MANIFEST
OS_CYLINDER: +1.00
OS_ADD: +2.50
OD_ADD: +2.50
OD_SPHERE: -1.75
OS_SPHERE: -0.75
OD_CYLINDER: +0.50
OD_AXIS: 015
OS_AXIS: 012

## 2023-06-12 ASSESSMENT — TONOMETRY
OS_IOP_MMHG: 24
OD_IOP_MMHG: 21
IOP_METHOD: APPLANATION

## 2023-06-12 ASSESSMENT — EXTERNAL EXAM - LEFT EYE: OS_EXAM: NORMAL

## 2023-06-12 ASSESSMENT — EXTERNAL EXAM - RIGHT EYE: OD_EXAM: NORMAL

## 2023-06-12 ASSESSMENT — CUP TO DISC RATIO
OD_RATIO: 0.6
OS_RATIO: 0.9

## 2023-06-12 NOTE — PATIENT INSTRUCTIONS
"Continue:   Cosopt (dorzolamide-timolol -- dark blue top) twice daily both eyes.   Start Latanoprost : One drop both eyes at bedtime  Wait at least 5 minutes between drops if using more than one at a time.   Glasses prescription given - optional  May use artificial tears up to four times a day (like Refresh Optive, Systane Balance, TheraTears, or generic artificial tears are ok. Avoid \"get the red out\" drops).   Obtain OCT today - will call with any concerns.    Return visit 2-3 weeks for an intraocular pressure check, pachy,  Hi Visual Field, and gonio.   Prabhjot Alonso M.D.  [phone]    "

## 2023-06-12 NOTE — PROGRESS NOTES
" Current Eye Medications:  Cosopt both eyes twice a day.  Last drops:  6am.  He has been on these for one year.  He was on a different drop for a couple years that were used once a day prior to Cosopt.     Subjective:  Patient is here for a Diabetic Eye Exam.  Patient complains of some cloudy vision in each eye.  He does not wear glasses.  Occasionally he wears over-the-counter readers.  Patient states he was started on drops secondary to \"flickering in his vision of his right eye.\"  The flickering resolved after being on the drops.      Last eye exam:  One year ago at [facility] [place].    No history of eye injuries or surgery.    No family history of glaucoma.      Lab Results   Component Value Date    A1C 7.3 05/18/2023    A1C 7.0 10/12/2022    A1C 6.5 01/04/2022    A1C 6.5 05/06/2021    A1C 6.1 07/09/2020    A1C 7.3 04/30/2019     DM 6-7 yrs; no meds.     Objective:  See Ophthalmology Exam.       Assessment:  Baseline eye exam in patient with diabetes.  No diabetic retinopathy.  Moderate cataract both eyes.  Probable advanced open angle glaucoma left eye and mild open angle glaucoma right eye.  Intraocular pressure too high both eyes.      ICD-10-CM    1. Encounter for examination of eyes and vision with abnormal findings  Z01.01       2. Presbyopia  H52.4 REFRACTIVE STATUS     EYE EXAM (SIMPLE-NONBILLABLE)      3. Type 2 diabetes mellitus with hyperglycemia, without long-term current use of insulin (H)  E11.65       4. Combined form of age-related cataract, mod, both eyes  H25.813       5. Primary open angle glaucoma (POAG) of left eye, severe stage  H40.1123       6. Primary open angle glaucoma (POAG) of right eye, mild stage  H40.1111       7. History of YAG laser iridotomy of both eyes  Z98.890            Plan:  Continue:   Cosopt (dorzolamide-timolol -- dark blue top) twice daily both eyes.   Start Latanoprost : One drop both eyes at bedtime  Wait at least 5 minutes between drops if using more than one at " "a time.   Glasses prescription given - optional  May use artificial tears up to four times a day (like Refresh Optive, Systane Balance, TheraTears, or generic artificial tears are ok. Avoid \"get the red out\" drops).   Obtain OCT today - will call with any concerns.    Return visit 2-3 weeks for an intraocular pressure check, pachy,  Hi Visual Field, and gonio.   Prabhjot Alonso M.D.  [phone]       "

## 2023-06-28 ENCOUNTER — OFFICE VISIT (OUTPATIENT)
Dept: OPHTHALMOLOGY | Facility: CLINIC | Age: 78
End: 2023-06-28
Payer: COMMERCIAL

## 2023-06-28 DIAGNOSIS — H40.1123 PRIMARY OPEN ANGLE GLAUCOMA (POAG) OF LEFT EYE, SEVERE STAGE: Primary | ICD-10-CM

## 2023-06-28 DIAGNOSIS — H40.1111 PRIMARY OPEN ANGLE GLAUCOMA (POAG) OF RIGHT EYE, MILD STAGE: ICD-10-CM

## 2023-06-28 PROBLEM — H25.813 COMBINED FORM OF AGE-RELATED CATARACT, BOTH EYES: Status: ACTIVE | Noted: 2023-06-28

## 2023-06-28 PROBLEM — Z98.890: Status: ACTIVE | Noted: 2023-06-28

## 2023-06-28 PROCEDURE — 92012 INTRM OPH EXAM EST PATIENT: CPT | Performed by: OPHTHALMOLOGY

## 2023-06-28 PROCEDURE — 92020 GONIOSCOPY: CPT | Performed by: OPHTHALMOLOGY

## 2023-06-28 PROCEDURE — 92083 EXTENDED VISUAL FIELD XM: CPT | Performed by: OPHTHALMOLOGY

## 2023-06-28 ASSESSMENT — GONIOSCOPY
OD_TEMPORAL: GRADE 1-2
OS_SUPERIOR: GRADE 1-2
OS_TEMPORAL: GRADE 1-2
OD_SUPERIOR: GRADE 1-2
OD_NASAL: GRADE 1-2
OS_NASAL: GRADE 1-2
OS_INFERIOR: GRADE 1-2
OD_INFERIOR: GRADE 1-2
METHOD: ZEISS, FOUR MIRROR

## 2023-06-28 ASSESSMENT — TONOMETRY
OD_IOP_MMHG: 14
OS_IOP_MMHG: 15
IOP_METHOD: APPLANATION

## 2023-06-28 ASSESSMENT — EXTERNAL EXAM - RIGHT EYE: OD_EXAM: NORMAL

## 2023-06-28 ASSESSMENT — PACHYMETRY
OS_CT(UM): 522
OD_CT(UM): 517

## 2023-06-28 ASSESSMENT — VISUAL ACUITY
METHOD: SNELLEN - LINEAR
OS_SC: 20/30
OS_SC+: -2
OD_SC: 20/30

## 2023-06-28 ASSESSMENT — EXTERNAL EXAM - LEFT EYE: OS_EXAM: NORMAL

## 2023-06-28 ASSESSMENT — SLIT LAMP EXAM - LIDS
COMMENTS: 2+ DERMATOCHALASIS
COMMENTS: 2+ DERMATOCHALASIS

## 2023-06-28 NOTE — PROGRESS NOTES
" Current Eye Medications:  Latanoprost - both eyes at bedtime - last dose: 7-8pm last night  Dorzolamide-timolol - both eyes BID - last dose: 7-8am today.      Subjective: HVF,  IOP, Pach and Gonioscopy - has started latanoprost, good about putting drops in daily.    Patient states current vision \"adequate.\"     Objective:  See Ophthalmology Exam.       Assessment:  Hi Visual Field confirms mild open angle glaucoma right eye and advanced open angle glaucoma left eye.  Thin corneas both eyes.  Intraocular pressure improved with addition of Latanoprost.  Would like intraocular pressure 15 or lower both eyes.      Plan:  Continue Dorzolamide/Timolol: one drop both eyes twice daily about 12 hours apart.  Continue Latanoprost: one drop both eyes at bedtime.  Always wait at least 5 minutes between drops.  Return visit 2 months for an intraocular pressure check.  Could consider selective laser trabeculoplasty in future; also cataract/glaucoma procedure left eye.  Prabhjot Alonso M.D.  [phone]         "

## 2023-06-28 NOTE — PATIENT INSTRUCTIONS
Continue Dorzolamide/Timolol: one drop both eyes twice daily about 12 hours apart.  Continue Latanoprost: one drop both eyes at bedtime.  Always wait at least 5 minutes between drops.  Return visit 2 months for an intraocular pressure check.  Prabhjot Alonso M.D.  [phone]

## 2023-06-28 NOTE — LETTER
"    6/28/2023         RE: Keyshawn Potter  [address]  [facility] [address]        Dear Colleague,    Thank you for referring your patient, Keyshawn Potter, to the [facility]. Please see a copy of my visit note below.     Current Eye Medications:  Latanoprost - both eyes at bedtime - last dose: 7-8pm last night  Dorzolamide-timolol - both eyes BID - last dose: 7-8am today.      Subjective: HVF,  IOP, Pach and Gonioscopy - has started latanoprost, good about putting drops in daily.    Patient states current vision \"adequate.\"     Objective:  See Ophthalmology Exam.       Assessment:  Hi Visual Field confirms mild open angle glaucoma right eye and advanced open angle glaucoma left eye.  Thin corneas both eyes.  Intraocular pressure improved with addition of Latanoprost.  Would like intraocular pressure 15 or lower both eyes.      Plan:  Continue Dorzolamide/Timolol: one drop both eyes twice daily about 12 hours apart.  Continue Latanoprost: one drop both eyes at bedtime.  Always wait at least 5 minutes between drops.  Return visit 2 months for an intraocular pressure check.  Could consider selective laser trabeculoplasty in future; also cataract/glaucoma procedure left eye.  Prabhjot Alonso M.D.  [phone]             Again, thank you for allowing me to participate in the care of your patient.        Sincerely,        Prabhjot Alonso MD    "

## 2023-09-28 DIAGNOSIS — E78.5 HYPERLIPIDEMIA LDL GOAL <100: ICD-10-CM

## 2023-09-28 RX ORDER — PRAVASTATIN SODIUM 10 MG
10 TABLET ORAL AT BEDTIME
Qty: 90 TABLET | Refills: 0 | Status: SHIPPED | OUTPATIENT
Start: 2023-09-28 | End: 2023-12-11

## 2023-12-06 ENCOUNTER — OFFICE VISIT (OUTPATIENT)
Dept: OPHTHALMOLOGY | Facility: CLINIC | Age: 78
End: 2023-12-06
Payer: COMMERCIAL

## 2023-12-06 DIAGNOSIS — H40.1123 PRIMARY OPEN ANGLE GLAUCOMA (POAG) OF LEFT EYE, SEVERE STAGE: Primary | ICD-10-CM

## 2023-12-06 DIAGNOSIS — H40.1111 PRIMARY OPEN ANGLE GLAUCOMA (POAG) OF RIGHT EYE, MILD STAGE: ICD-10-CM

## 2023-12-06 PROCEDURE — 99207 PR NO CHARGE LOS: CPT | Performed by: OPHTHALMOLOGY

## 2023-12-06 ASSESSMENT — VISUAL ACUITY: METHOD: SNELLEN - LINEAR

## 2023-12-06 NOTE — LETTER
12/6/2023         RE: Keyshawn Potter  [address]  [facility] [place] [facility] [address]        Dear Colleague,    Thank you for referring your patient, Keyshawn Potter, to the [facility]. Please see a copy of my visit note below.     Current Eye Medications:  Latanoprost both eyes every evening, Cosopt both eyes twice a day.  Last drops:        Subjective:  Follow up Open Angle Glaucoma.  The patient is here for a pressure check.      I went to the waiting room at 7:40 to get the patient, but he was not in the waiting room.  I also checked upstairs.  Multiple other techs called for him at later intervals, but he was not in the clinic.       Objective:  See Ophthalmology Exam.       Assessment:  see below.      Plan:  Patient not present for work up or exam after checking in.  Prabhjot Alonso M.D.  [phone]       Again, thank you for allowing me to participate in the care of your patient.        Sincerely,        Prabhjot Alonso MD

## 2023-12-06 NOTE — PROGRESS NOTES
Current Eye Medications:  Latanoprost both eyes every evening, Cosopt both eyes twice a day.  Last drops:        Subjective:  Follow up Open Angle Glaucoma.  The patient is here for a pressure check.      I went to the waiting room at 7:40 to get the patient, but he was not in the waiting room.  I also checked upstairs.  Multiple other techs called for him at later intervals, but he was not in the clinic.       Objective:  See Ophthalmology Exam.       Assessment:  see below.      Plan:  Patient not present for work up or exam after checking in.  Prabhjot Alonso M.D.  [phone]

## 2023-12-11 ENCOUNTER — OFFICE VISIT (OUTPATIENT)
Dept: FAMILY MEDICINE | Facility: CLINIC | Age: 78
End: 2023-12-11
Payer: COMMERCIAL

## 2023-12-11 VITALS
TEMPERATURE: 97.4 F | BODY MASS INDEX: 33.01 KG/M2 | HEART RATE: 67 BPM | OXYGEN SATURATION: 95 % | RESPIRATION RATE: 26 BRPM | SYSTOLIC BLOOD PRESSURE: 128 MMHG | WEIGHT: 217.8 LBS | DIASTOLIC BLOOD PRESSURE: 82 MMHG | HEIGHT: 68 IN

## 2023-12-11 DIAGNOSIS — R32 URINARY INCONTINENCE, UNSPECIFIED TYPE: ICD-10-CM

## 2023-12-11 DIAGNOSIS — E78.5 HYPERLIPIDEMIA LDL GOAL <100: ICD-10-CM

## 2023-12-11 DIAGNOSIS — Z00.00 ENCOUNTER FOR MEDICARE ANNUAL WELLNESS EXAM: Primary | ICD-10-CM

## 2023-12-11 DIAGNOSIS — Z23 NEED FOR VACCINATION: ICD-10-CM

## 2023-12-11 DIAGNOSIS — N18.32 CHRONIC KIDNEY DISEASE, STAGE 3B (H): ICD-10-CM

## 2023-12-11 DIAGNOSIS — E11.65 TYPE 2 DIABETES MELLITUS WITH HYPERGLYCEMIA, WITHOUT LONG-TERM CURRENT USE OF INSULIN (H): ICD-10-CM

## 2023-12-11 LAB
HBA1C MFR BLD: 7.1 % (ref 0–5.6)
HGB BLD-MCNC: 15.3 G/DL (ref 13.3–17.7)

## 2023-12-11 PROCEDURE — 82043 UR ALBUMIN QUANTITATIVE: CPT | Performed by: NURSE PRACTITIONER

## 2023-12-11 PROCEDURE — 80048 BASIC METABOLIC PNL TOTAL CA: CPT | Performed by: NURSE PRACTITIONER

## 2023-12-11 PROCEDURE — 80061 LIPID PANEL: CPT | Performed by: NURSE PRACTITIONER

## 2023-12-11 PROCEDURE — 90662 IIV NO PRSV INCREASED AG IM: CPT | Performed by: NURSE PRACTITIONER

## 2023-12-11 PROCEDURE — 36415 COLL VENOUS BLD VENIPUNCTURE: CPT | Performed by: NURSE PRACTITIONER

## 2023-12-11 PROCEDURE — 82570 ASSAY OF URINE CREATININE: CPT | Performed by: NURSE PRACTITIONER

## 2023-12-11 PROCEDURE — G0439 PPPS, SUBSEQ VISIT: HCPCS | Performed by: NURSE PRACTITIONER

## 2023-12-11 PROCEDURE — G0008 ADMIN INFLUENZA VIRUS VAC: HCPCS | Performed by: NURSE PRACTITIONER

## 2023-12-11 PROCEDURE — 99214 OFFICE O/P EST MOD 30 MIN: CPT | Mod: 25 | Performed by: NURSE PRACTITIONER

## 2023-12-11 PROCEDURE — 85018 HEMOGLOBIN: CPT | Performed by: NURSE PRACTITIONER

## 2023-12-11 PROCEDURE — 83036 HEMOGLOBIN GLYCOSYLATED A1C: CPT | Performed by: NURSE PRACTITIONER

## 2023-12-11 RX ORDER — METFORMIN HCL 500 MG
TABLET, EXTENDED RELEASE 24 HR ORAL
Qty: 180 TABLET | Refills: 1 | Status: SHIPPED | OUTPATIENT
Start: 2023-12-11 | End: 2024-06-03

## 2023-12-11 RX ORDER — PRAVASTATIN SODIUM 10 MG
10 TABLET ORAL AT BEDTIME
Qty: 90 TABLET | Refills: 3 | Status: SHIPPED | OUTPATIENT
Start: 2023-12-11

## 2023-12-11 RX ORDER — RESPIRATORY SYNCYTIAL VIRUS VACCINE 120MCG/0.5
0.5 KIT INTRAMUSCULAR ONCE
Qty: 1 EACH | Refills: 0 | Status: CANCELLED | OUTPATIENT
Start: 2023-12-11 | End: 2023-12-11

## 2023-12-11 RX ORDER — TAMSULOSIN HYDROCHLORIDE 0.4 MG/1
0.4 CAPSULE ORAL DAILY
Qty: 90 CAPSULE | Refills: 1 | Status: SHIPPED | OUTPATIENT
Start: 2023-12-11 | End: 2024-01-23 | Stop reason: DRUGHIGH

## 2023-12-11 ASSESSMENT — ENCOUNTER SYMPTOMS
COUGH: 0
HEADACHES: 0
PALPITATIONS: 0
FEVER: 0
CHILLS: 0
FREQUENCY: 1
HEMATOCHEZIA: 0
HEMATURIA: 0
EYE PAIN: 0
PARESTHESIAS: 1
CONSTIPATION: 0
NERVOUS/ANXIOUS: 0
WEAKNESS: 0
ABDOMINAL PAIN: 0
SHORTNESS OF BREATH: 0
SORE THROAT: 0
NAUSEA: 0
DYSURIA: 0
HEARTBURN: 0
MYALGIAS: 0
DIZZINESS: 0
ARTHRALGIAS: 0
DIARRHEA: 0
JOINT SWELLING: 0

## 2023-12-11 ASSESSMENT — PAIN SCALES - GENERAL: PAINLEVEL: NO PAIN (0)

## 2023-12-11 ASSESSMENT — ACTIVITIES OF DAILY LIVING (ADL): CURRENT_FUNCTION: NO ASSISTANCE NEEDED

## 2023-12-11 NOTE — PATIENT INSTRUCTIONS
If urinary leaking does not improve, please follow-up with PCP and you may need referral to urology.     Patient Education   Personalized Prevention Plan  You are due for the preventive services outlined below.  Your care team is available to assist you in scheduling these services.  If you have already completed any of these items, please share that information with your care team to update in your medical record.  Health Maintenance Due   Topic Date Due    RSV VACCINE (Pregnancy & 60+) (1 - 1-dose 60+ series) Never done    Zoster (Shingles) Vaccine (2 of 3) 04/12/2011    Diptheria Tetanus Pertussis (DTAP/TDAP/TD) Vaccine (3 - Td or Tdap) 03/07/2018    Pneumococcal Vaccine (3 - PPSV23 or PCV20) 07/09/2021    Flu Vaccine (1) 09/01/2023    COVID-19 Vaccine (4 - 2023-24 season) 09/01/2023    Annual Wellness Visit  10/12/2023    Cholesterol Lab  10/12/2023    Diabetic Foot Exam  10/12/2023    A1C Lab  11/18/2023    Basic Metabolic Panel  11/18/2023    Kidney Microalbumin Urine Test  11/18/2023    Hemoglobin  10/12/2023

## 2023-12-11 NOTE — PROGRESS NOTES
"SUBJECTIVE:   Keyshawn is a 77 year old, presenting for the following:  Medicare Visit, Diabetes, Hypertension, and Urinary Problem        12/11/2023     8:06 AM   Additional Questions   Roomed by Tran   Accompanied by none         12/11/2023     8:06 AM   Patient Reported Additional Medications   Patient reports taking the following new medications none       Are you in the first 12 months of your Medicare coverage?  No    Healthy Habits:     In general, how would you rate your overall health?  Good    Frequency of exercise:  None    Do you usually eat at least 4 servings of fruit and vegetables a day, include whole grains    & fiber and avoid regularly eating high fat or \"junk\" foods?  No    Taking medications regularly:  Yes    Medication side effects:  Not applicable    Ability to successfully perform activities of daily living:  No assistance needed    Home Safety:  No safety concerns identified    Hearing Impairment:  No hearing concerns    In the past 6 months, have you been bothered by leaking of urine? Yes    In general, how would you rate your overall mental or emotional health?  Excellent    Additional concerns today:  Yes      Today's PHQ-2 Score:       12/11/2023     8:15 AM   PHQ-2 ( 1999 [employer])   Q1: Little interest or pleasure in doing things 0   Q2: Feeling down, depressed or hopeless 0   PHQ-2 Score 0   Q1: Little interest or pleasure in doing things Not at all   Q2: Feeling down, depressed or hopeless Not at all   PHQ-2 Score 0           Have you ever done Advance Care Planning? (For example, a Health Directive, POLST, or a discussion with a medical provider or your loved ones about your wishes): Yes, advance care planning is on file.       Fall risk  Fallen 2 or more times in the past year?: No  Any fall with injury in the past year?: No    Cognitive Screening Clock score 2,  Word score 3     Do you have sleep apnea, excessive snoring or daytime drowsiness? : yes    Reviewed and updated as needed " this visit by clinical staff   Tobacco  Allergies  Meds              Reviewed and updated as needed this visit by Provider                 Social History     Tobacco Use    Smoking status: Former     Types: Cigarettes     Start date: 1968     Quit date: 1978     Years since quittin.9     Passive exposure: Never    Smokeless tobacco: Never   Substance Use Topics    Alcohol use: Not Currently             2023     8:15 AM   Alcohol Use   Prescreen: >3 drinks/day or >7 drinks/week? No     Do you have a current opioid prescription? No  Do you use any other controlled substances or medications that are not prescribed by a provider? None            Genitourinary - Male  Onset: 1 year or so, has a little bit of leaking of urine   Description:   Dysuria (painful urination): no   Hematuria (blood in urine): YES- possibly had some brownish discolored urine 1 day about 2-3 months ago, none since  Frequency: YES  Are you urinating at night : YES- will wake up once usually   Hesitancy (delay in urine): no   Retention (unable to empty): unsure   Decrease in urinary flow: YES  Incontinence: YES- some leaking down the leg  Progression of Symptoms:  same  Accompanying Signs & Symptoms:  Fever: no   Back/Flank pain: no   Urethral discharge: no   Testicle lumps/masses/pain: no   Nausea and/or vomiting: no   Abdominal pain: no   History:   History of frequent UTI's: no   History of kidney stones: no   History of hernias: YES, currently has an abdominal hernia the past 30 years  Personal or Family history of Prostate problems: No  Sexually active: no   Precipitating factors:   unsure  Alleviating factors:  Unsure    Concern - feels a numbness on skin of abdomen, will get on one side and then the other, the feeling lasts 1 hour to a few days, worse in the wintertime, wondering if related to gall bladder or gall stones (went to the ER and advised should have gall bladder removed but the pain went away so he declined)    Onset: at least a year or so  Description:   As above  Intensity: mild  Progression of Symptoms:  same  Accompanying Signs & Symptoms:  none  Previous history of similar problem:   none  Precipitating factors:   Worsened by: none  Alleviating factors:  Improved by: none    Therapies Tried and outcome:none     Diabetes Follow-up    How often are you checking your blood sugar? A few times a week  What time of day are you checking your blood sugars (select all that apply)?  Before meals  Have you had any blood sugars above 200?  Yes right about 200 in the am   Have you had any blood sugars below 70?  No  What symptoms do you notice when your blood sugar is low?  None  What concerns do you have today about your diabetes? None   Do you have any of these symptoms? (Select all that apply)  No numbness or tingling in feet.  No redness, sores or blisters on feet.  No complaints of excessive thirst.  No reports of blurry vision.  No significant changes to weight.          Hyperlipidemia Follow-Up    Are you regularly taking any medication or supplement to lower your cholesterol?   Yes- Pravachol   Are you having muscle aches or other side effects that you think could be caused by your cholesterol lowering medication?  Yes- unsure, feels like he is walking funny, unsure if related to this medication or Metformin     Hypertension Follow-up    Do you check your blood pressure regularly outside of the clinic? No   Are you following a low salt diet? No  Are your blood pressures ever more than 140 on the top number (systolic) OR more   than 90 on the bottom number (diastolic), for example 140/90? No    BP Readings from Last 2 Encounters:   12/11/23 128/82   05/18/23 130/80     Hemoglobin A1C (%)   Date Value   05/18/2023 7.3 (H)   10/12/2022 7.0 (H)   05/06/2021 6.5 (H)     LDL Cholesterol Calculated (mg/dL)   Date Value   10/12/2022 64   05/06/2021 84   07/09/2020 82     LDL Cholesterol Direct (mg/dL)   Date Value   07/02/2013  107     Additional provider notes: Patient presents in clinic for annual physical and other concerns.     T2DM: takes metformin 2 tablets once a day. Needing refills.     CKD: last creat was 1.35    HLD: on pravastatin, needing refills.     HTN: on amlodipine-benazepril    Right abdominal numbness/pain: states this happens on the left abdomen then jumps to the right side. Goes away after a day. States it only happens in the winter. Not debilitating. States his friend has something similar on his leg. He just wants to know what it is. Hx of gallbladder pain and was told in ER needing to have gallbladder removed (MRI with cholelithiasis). He declined surgery as symptoms went away.     Urinary leaking: ongoing for 1 year. States someone was supposed to prescribed flomax for him, but they prescribed flonase. Only record of flonase was for a cold he was seen virtually for. He would like to try Flomax.    Umbilical hernia: has had it for 20-30 years. Doesn't want to get surgery. Denies pain, so has been told he can wait.     Sees eye provider for eye drop refills.     Allergies   Allergen Reactions    Cats     Dust Mites        Current Outpatient Medications   Medication    amLODIPine-benazepril (LOTREL) 5-10 MG capsule    aspirin (ASA) 81 MG EC tablet    blood glucose (NO BRAND SPECIFIED) test strip    blood glucose monitoring (SOFTCLIX) lancets    Coenzyme Q10 (CO Q-10) 30 MG CAPS    dorzolamide-timolol (COSOPT) 2-0.5 % ophthalmic solution    ibuprofen (ADVIL/MOTRIN) 800 MG tablet    latanoprost (XALATAN) 0.005 % ophthalmic solution    metFORMIN (GLUCOPHAGE XR) 500 MG 24 hr tablet    pravastatin (PRAVACHOL) 10 MG tablet    tamsulosin (FLOMAX) 0.4 MG capsule    fluticasone (FLONASE) 50 MCG/ACT nasal spray     No current facility-administered medications for this visit.       Past Medical History:   Diagnosis Date    Chronic kidney disease, stage 3b (H) 01/04/2022    Diabetes (H)           Current providers sharing in  care for this patient include:   Patient Care Team:  Yazmin Trivedi NP as PCP - General (Nurse Practitioner - Family)  Yazmin Trivedi NP as Assigned PCP  Sergey Herrera OD as MD (Optometrist)  Yazmin Trivedi NP as Referring Physician (Nurse Practitioner - Family)  Prabhjot Alonso MD as MD (Ophthalmology)  Prabhjot Alonso MD as Assigned Surgical Provider    The following health maintenance items are reviewed in Epic and correct as of today:  Health Maintenance   Topic Date Due    RSV VACCINE (Pregnancy & 60+) (1 - 1-dose 60+ series) Never done    ZOSTER IMMUNIZATION (2 of 3) 04/12/2011    DTAP/TDAP/TD IMMUNIZATION (3 - Td or Tdap) 03/07/2018    Pneumococcal Vaccine: 65+ Years (3 - PPSV23 or PCV20) 07/09/2021    COVID-19 Vaccine (4 - 2023-24 season) 09/01/2023    MEDICARE ANNUAL WELLNESS VISIT  10/12/2023    LIPID  10/12/2023    DIABETIC FOOT EXAM  10/12/2023    A1C  11/18/2023    BMP  11/18/2023    MICROALBUMIN  11/18/2023    HEMOGLOBIN  10/12/2023    ANNUAL REVIEW OF HM ORDERS  05/18/2024    EYE EXAM  06/28/2024    FALL RISK ASSESSMENT  12/11/2024    ADVANCE CARE PLANNING  12/11/2028    PARATHYROID  Completed    PHOSPHORUS  Completed    HEPATITIS C SCREENING  Completed    PHQ-2 (once per calendar year)  Completed    INFLUENZA VACCINE  Completed    URINALYSIS  Completed    ALK PHOS  Completed    IPV IMMUNIZATION  Aged Out    HPV IMMUNIZATION  Aged Out    MENINGITIS IMMUNIZATION  Aged Out    RSV MONOCLONAL ANTIBODY  Aged Out           Review of Systems   Constitutional:  Negative for chills and fever.   HENT:  Negative for congestion, ear pain, hearing loss and sore throat.    Eyes:  Positive for visual disturbance. Negative for pain.   Respiratory:  Negative for cough and shortness of breath.    Cardiovascular:  Negative for chest pain, palpitations and peripheral edema.   Gastrointestinal:  Negative for abdominal pain, constipation, diarrhea, heartburn, hematochezia and nausea.   Genitourinary:   "Positive for frequency and urgency. Negative for dysuria, genital sores, hematuria, impotence and penile discharge.   Musculoskeletal:  Negative for arthralgias, joint swelling and myalgias.   Skin:  Negative for rash.   Neurological:  Positive for paresthesias. Negative for dizziness, weakness and headaches.   Psychiatric/Behavioral:  Negative for mood changes. The patient is not nervous/anxious.          OBJECTIVE:   /82 (BP Location: Right arm, Patient Position: Sitting, Cuff Size: Adult Large)   Pulse 67   Temp 97.4  F (36.3  C) (Oral)   Resp 26   Ht 1.715 m (5' 7.5\")   Wt 98.8 kg (217 lb 12.8 oz)   SpO2 95%   BMI 33.61 kg/m   Estimated body mass index is 33.61 kg/m  as calculated from the following:    Height as of this encounter: 1.715 m (5' 7.5\").    Weight as of this encounter: 98.8 kg (217 lb 12.8 oz).  Physical Exam  GENERAL: healthy, alert and no distress  EYES: Eyes grossly normal to inspection, PERRL and conjunctivae and sclerae normal  HENT: ear canals and TM's normal, nose and mouth without ulcers or lesions  NECK: no adenopathy, no asymmetry, masses, or scars and thyroid normal to palpation  RESP: lungs clear to auscultation - no rales, rhonchi or wheezes  CV: regular rate and rhythm, normal S1 S2, no S3 or S4, no murmur, click or rub, no peripheral edema and peripheral pulses strong  ABDOMEN: soft, nontender, no hepatosplenomegaly, no masses and bowel sounds normal  MS: no gross musculoskeletal defects noted, no edema  SKIN: no suspicious lesions or rashes  NEURO: Normal strength and tone, mentation intact and speech normal  PSYCH: mentation appears normal, affect normal/bright    Diagnostic Test Results:  Labs reviewed in Epic    ASSESSMENT / PLAN:       ICD-10-CM    1. Encounter for Medicare annual wellness exam  Z00.00       2. Type 2 diabetes mellitus with hyperglycemia, without long-term current use of insulin (H)  E11.65 Lipid panel reflex to direct LDL Fasting     HEMOGLOBIN A1C " "    metFORMIN (GLUCOPHAGE XR) 500 MG 24 hr tablet      3. Urinary incontinence, unspecified type  R32 tamsulosin (FLOMAX) 0.4 MG capsule      4. Chronic kidney disease, stage 3b (H)  N18.32 BASIC METABOLIC PANEL     Albumin Random Urine Quantitative with Creat Ratio     Hemoglobin      5. Hyperlipidemia LDL goal <100  E78.5 pravastatin (PRAVACHOL) 10 MG tablet      6. Need for vaccination  Z23 INFLUENZA VACCINE 65+ (FLUZONE HD)      -Fasting labs done today.   -Urinary leaking: flomax started. Follow-up with PCP and possible urology if symptoms do not improve.       Patient has been advised of split billing requirements and indicates understanding: No      COUNSELING:  Reviewed preventive health counseling, as reflected in patient instructions       Regular exercise       Healthy diet/nutrition       Vision screening       Dental care       Immunizations  Vaccinated for: Influenza and Declined: Covid-19 and Pneumococcal due to Other declined       Colon cancer screening       Prostate cancer screening      BMI:   Estimated body mass index is 33.61 kg/m  as calculated from the following:    Height as of this encounter: 1.715 m (5' 7.5\").    Weight as of this encounter: 98.8 kg (217 lb 12.8 oz).         He reports that he quit smoking about 45 years ago. His smoking use included cigarettes. He started smoking about 55 years ago. He has never been exposed to tobacco smoke. He has never used smokeless tobacco.      Appropriate preventive services were discussed with this patient, including applicable screening as appropriate for fall prevention, nutrition, physical activity, Tobacco-use cessation, weight loss and cognition.  Checklist reviewing preventive services available has been given to the patient.    Reviewed patients plan of care and provided an AVS. The Basic Care Plan (routine screening as documented in Health Maintenance) for Keyshawn meets the Care Plan requirement. This Care Plan has been established and " reviewed with the Patient.        Sarah Diaz DNP  [facility]    Identified Health Risks:  I have reviewed Opioid Use Disorder and Substance Use Disorder risk factors and made any needed referrals.

## 2023-12-12 LAB
ANION GAP SERPL CALCULATED.3IONS-SCNC: 10 MMOL/L (ref 7–15)
BUN SERPL-MCNC: 17.6 MG/DL (ref 8–23)
CALCIUM SERPL-MCNC: 9.5 MG/DL (ref 8.8–10.2)
CHLORIDE SERPL-SCNC: 103 MMOL/L (ref 98–107)
CHOLEST SERPL-MCNC: 162 MG/DL
CREAT SERPL-MCNC: 1.44 MG/DL (ref 0.67–1.17)
CREAT UR-MCNC: 165 MG/DL
DEPRECATED HCO3 PLAS-SCNC: 23 MMOL/L (ref 22–29)
EGFRCR SERPLBLD CKD-EPI 2021: 50 ML/MIN/1.73M2
FASTING STATUS PATIENT QL REPORTED: YES
GLUCOSE SERPL-MCNC: 116 MG/DL (ref 70–99)
HDLC SERPL-MCNC: 33 MG/DL
LDLC SERPL CALC-MCNC: 90 MG/DL
MICROALBUMIN UR-MCNC: 102 MG/L
MICROALBUMIN/CREAT UR: 61.82 MG/G CR (ref 0–17)
NONHDLC SERPL-MCNC: 129 MG/DL
POTASSIUM SERPL-SCNC: 4.6 MMOL/L (ref 3.4–5.3)
SODIUM SERPL-SCNC: 136 MMOL/L (ref 135–145)
TRIGL SERPL-MCNC: 194 MG/DL

## 2023-12-19 ENCOUNTER — TELEPHONE (OUTPATIENT)
Dept: FAMILY MEDICINE | Facility: CLINIC | Age: 78
End: 2023-12-19
Payer: COMMERCIAL

## 2023-12-19 NOTE — TELEPHONE ENCOUNTER
"Attempt # 1 to reach patient    Christine M Klisch, RN              ----- Message from Ania Carreno MD sent at 12/19/2023  8:21 AM CST -----  Covering for Crystral    Please call pt with results.    A1c at 7.1  Continue with current medications  Please advise pt with diet and increase physical activities    Follow up with PCP in 6 months for Diabetes follow up.     Kidney remain stable,    Normal for blood sugar and electrolyte.    Total Cholesterol normal, and LDL normal    Continue with current medications    Normal for Hemoglobin,     Thanks.  Ways to improve your cholesterol...     1- Eats less saturated fats (including avoiding \"trans\" fats), fatty foods.  Eat more baked food, than fried food.     2 - Eat more unsaturated fats  - found in vegetables, grains, and tree nuts.  Also by replacing butter with canola oil or olive oil.     3 - Eat more nuts.  1-2 ounces (a small handful) of almonds, walnuts, hazelnuts or pecans once a day in place of other less healthy snacks.     4 - Eat more high fiber foods - vegetables and whole grains including oat bran, oats, beans, peas, and flax seed.     5 - Eat more fish - such as salmon, tuna, mackerel, and sardines.  1 or 2 six ounce servings per week is a healthy replacement for other proteins.     6 - Exercise for at least 120 minutes per week - which is equal to 30 minutes 4 days per week.    7- avoid late meals, at least  a few hours before bedtime.                 "

## 2023-12-19 NOTE — TELEPHONE ENCOUNTER
Patient called back and RN relayed providers message. Patient verbalized understanding. Patient would like a copy of all labs/result completed on 12/11/23 mailed to him including results note. RN confirmed address on file.       Mar Quiroz RN on 12/19/2023 at 12:05 PM

## 2023-12-19 NOTE — LETTER
"December 19, 2023      Keyshawn Potter  [address]  [facility] [address]        Dear ,    We are writing to inform you of your test results.    A1c at 7.1    Continue with current medications    Please see attached diet and increase physical activities     Follow up with PCP in 6 months for Diabetes follow up.      Kidney remain stable,     Normal for blood sugar and electrolyte.     Total Cholesterol normal, and LDL normal     Continue with current medications     Normal for Hemoglobin,      Thanks.  Ways to improve your cholesterol...     1- Eats less saturated fats (including avoiding \"trans\" fats), fatty foods.  Eat more baked food, than fried food.     2 - Eat more unsaturated fats  - found in vegetables, grains, and tree nuts.  Also by replacing butter with canola oil or olive oil.     3 - Eat more nuts.  1-2 ounces (a small handful) of almonds, walnuts, hazelnuts or pecans once a day in place of other less healthy snacks.     4 - Eat more high fiber foods - vegetables and whole grains including oat bran, oats, beans, peas, and flax seed.     5 - Eat more fish - such as salmon, tuna, mackerel, and sardines.  1 or 2 six ounce servings per week is a healthy replacement for other proteins.     6 - Exercise for at least 120 minutes per week - which is equal to 30 minutes 4 days per week.     7- avoid late meals, at least  a few hours before bedtime.             Resulted Orders   Lipid panel reflex to direct LDL Fasting   Result Value Ref Range    Cholesterol 162 <200 mg/dL    Triglycerides 194 (H) <150 mg/dL    Direct Measure HDL 33 (L) >=40 mg/dL    LDL Cholesterol Calculated 90 <=100 mg/dL    Non HDL Cholesterol 129 <130 mg/dL    Patient Fasting > 8hrs? Yes     Narrative    Cholesterol  Desirable:  <200 mg/dL    Triglycerides  Normal:  Less than 150 mg/dL  Borderline High:  150-199 mg/dL  High:  200-499 mg/dL  Very High:  Greater than or equal to 500 mg/dL    Direct Measure HDL  Female:  Greater " than or equal to 50 mg/dL   Male:  Greater than or equal to 40 mg/dL    LDL Cholesterol  Desirable:  <100mg/dL  Above Desirable:  100-129 mg/dL   Borderline High:  130-159 mg/dL   High:  160-189 mg/dL   Very High:  >= 190 mg/dL    Non HDL Cholesterol  Desirable:  130 mg/dL  Above Desirable:  130-159 mg/dL  Borderline High:  160-189 mg/dL  High:  190-219 mg/dL  Very High:  Greater than or equal to 220 mg/dL   HEMOGLOBIN A1C   Result Value Ref Range    Hemoglobin A1C 7.1 (H) 0.0 - 5.6 %      Comment:      Normal <5.7%   Prediabetes 5.7-6.4%    Diabetes 6.5% or higher     Note: Adopted from ADA consensus guidelines.   BASIC METABOLIC PANEL   Result Value Ref Range    Sodium 136 135 - 145 mmol/L      Comment:      Reference intervals for this test were updated on 09/26/2023 to more accurately reflect our healthy population. There may be differences in the flagging of prior results with similar values performed with this method. Interpretation of those prior results can be made in the context of the updated reference intervals.     Potassium 4.6 3.4 - 5.3 mmol/L    Chloride 103 98 - 107 mmol/L    Carbon Dioxide (CO2) 23 22 - 29 mmol/L    Anion Gap 10 7 - 15 mmol/L    Urea Nitrogen 17.6 8.0 - 23.0 mg/dL    Creatinine 1.44 (H) 0.67 - 1.17 mg/dL    GFR Estimate 50 (L) >60 mL/min/1.73m2    Calcium 9.5 8.8 - 10.2 mg/dL    Glucose 116 (H) 70 - 99 mg/dL   Albumin Random Urine Quantitative with Creat Ratio   Result Value Ref Range    Creatinine Urine mg/dL 165.0 mg/dL      Comment:      The reference ranges have not been established in urine creatinine. The results should be integrated into the clinical context for interpretation.    Albumin Urine mg/L 102.0 mg/L      Comment:      The reference ranges have not been established in urine albumin. The results should be integrated into the clinical context for interpretation.    Albumin Urine mg/g Cr 61.82 (H) 0.00 - 17.00 mg/g Cr      Comment:      Microalbuminuria is defined as  an albumin:creatinine ratio of 17 to 299 for males and 25 to 299 for females. A ratio of albumin:creatinine of 300 or higher is indicative of overt proteinuria.  Due to biologic variability, positive results should be confirmed by a second, first-morning random or 24-hour timed urine specimen. If there is discrepancy, a third specimen is recommended. When 2 out of 3 results are in the microalbuminuria range, this is evidence for incipient nephropathy and warrants increased efforts at glucose control, blood pressure control, and institution of therapy with an angiotensin-converting-enzyme (ACE) inhibitor (if the patient can tolerate it).     Hemoglobin   Result Value Ref Range    Hemoglobin 15.3 13.3 - 17.7 g/dL       If you have any questions or concerns, please call the clinic at the number listed above.       Sincerely,

## 2024-01-23 ENCOUNTER — VIRTUAL VISIT (OUTPATIENT)
Dept: FAMILY MEDICINE | Facility: CLINIC | Age: 79
End: 2024-01-23
Payer: COMMERCIAL

## 2024-01-23 DIAGNOSIS — E66.01 MORBID OBESITY (H): ICD-10-CM

## 2024-01-23 DIAGNOSIS — E11.65 TYPE 2 DIABETES MELLITUS WITH HYPERGLYCEMIA, WITHOUT LONG-TERM CURRENT USE OF INSULIN (H): ICD-10-CM

## 2024-01-23 DIAGNOSIS — R97.20 ELEVATED PROSTATE SPECIFIC ANTIGEN (PSA): ICD-10-CM

## 2024-01-23 DIAGNOSIS — N18.32 TYPE 2 DIABETES MELLITUS WITH STAGE 3B CHRONIC KIDNEY DISEASE, WITHOUT LONG-TERM CURRENT USE OF INSULIN (H): ICD-10-CM

## 2024-01-23 DIAGNOSIS — N18.32 CHRONIC KIDNEY DISEASE, STAGE 3B (H): ICD-10-CM

## 2024-01-23 DIAGNOSIS — E11.22 TYPE 2 DIABETES MELLITUS WITH STAGE 3B CHRONIC KIDNEY DISEASE, WITHOUT LONG-TERM CURRENT USE OF INSULIN (H): ICD-10-CM

## 2024-01-23 DIAGNOSIS — R35.0 URINARY FREQUENCY: ICD-10-CM

## 2024-01-23 DIAGNOSIS — R39.9 LOWER URINARY TRACT SYMPTOMS (LUTS): Primary | ICD-10-CM

## 2024-01-23 PROCEDURE — 99442 PR PHYSICIAN TELEPHONE EVALUATION 11-20 MIN: CPT | Mod: 93 | Performed by: NURSE PRACTITIONER

## 2024-01-23 RX ORDER — TAMSULOSIN HYDROCHLORIDE 0.4 MG/1
0.8 CAPSULE ORAL DAILY
Qty: 60 CAPSULE | Refills: 1 | Status: SHIPPED | OUTPATIENT
Start: 2024-01-23 | End: 2024-01-23

## 2024-01-23 RX ORDER — TAMSULOSIN HYDROCHLORIDE 0.4 MG/1
0.8 CAPSULE ORAL DAILY
Qty: 180 CAPSULE | Refills: 0 | Status: SHIPPED | OUTPATIENT
Start: 2024-01-23

## 2024-01-23 NOTE — PROGRESS NOTES
Keyshawn is a 78 year old who is being evaluated via a billable telephone visit.      What phone number would you like to be contacted at? [phone]   How would you like to obtain your AVS? Mail a copy    Distant Location (provider location):  Off-site    Assessment & Plan     Lower urinary tract symptoms (LUTS)  Did not have symptom improvement with Flomax 0.4 mg daily.  Discussed trial of dose increase to see if there is any therapeutic effect.  - tamsulosin (FLOMAX) 0.4 MG capsule; Take 2 capsules (0.8 mg) by mouth daily    Urinary frequency    - tamsulosin (FLOMAX) 0.4 MG capsule; Take 2 capsules (0.8 mg) by mouth daily    Elevated prostate specific antigen (PSA)  Has h/o mildly elevated PSA 7.36 was the last time it was checked May 2023.  I strongly encouraged patient to follow up with Urology because differentials include prostate cancer, enlarged prostate, etc due to his elevated PSA with the lower urinary tract symptoms.  Patient yet again declines Urology referral today, he prefers to try increase in Flomax dose first.  If symptoms not improving/resolving then recommend follow up with Urology.    Type 2 diabetes mellitus with stage 3b chronic kidney disease, without long-term current use of insulin (H)  Known issue that I take into account for their medical decisions, no current exacerbations or new concerns.     Type 2 diabetes mellitus with hyperglycemia, without long-term current use of insulin (H)  Known issue that I take into account for their medical decisions, no current exacerbations or new concerns.     Morbid obesity (H)  Known issue that I take into account for their medical decisions, no current exacerbations or new concerns.     Chronic kidney disease, stage 3b (H)  Known issue that I take into account for their medical decisions, no current exacerbations or new concerns.                 Subjective   Keyshawn is a 78 year old, presenting for the following health issues:  Recheck Medication         12/11/2023     8:06 AM   Additional Questions   Roomed by Tran   Accompanied by none     HPI     Medication Followup of Flomax/Tamsulosin 0.4 mg capsule  Taking Medication as prescribed: NO-has it did not help symptoms   Side Effects:  None  Medication Helping Symptoms:  NO, still leaking of Urine     Additional provider notes:    Was prescribed Flomax 0.4 mg by Dr. Diaz 12/11/23 for urinary incontinence.  Going 10-20 times urination daily.  And will leak down the leg.  At night sometimes 3-4 times versus 10 times.  Stream does not seem obstructed.  This has been ongoing for the past 6 months.  No burning or pain with urination.  Urinary frequency, also feels urgency.  Says he took     He says his bowels are moving okay.    Pain around stomach for a week at a time that comes and goes.  Quality: when he touches his abdomen if feels like a stinking sensation.  Not associated with or without food.  No constipation or blood in stool.  No nausea, vomiting, or diarrhea.    Has h/o mildly elevated PSA last checked 5/2023 was 7.36.  He continues to decline seeing Urology.          Objective           Vitals:  No vitals were obtained today due to virtual visit.    Physical Exam   General: Alert and no distress //Respiratory: No audible wheeze, cough, or shortness of breath // Psychiatric:  Appropriate affect, tone, and pace of words            Phone call duration: 15 minutes  Signed Electronically by: Yazmin Trivedi NP

## 2024-03-14 ENCOUNTER — OFFICE VISIT (OUTPATIENT)
Dept: OPHTHALMOLOGY | Facility: CLINIC | Age: 79
End: 2024-03-14
Payer: COMMERCIAL

## 2024-03-14 DIAGNOSIS — H40.1111 PRIMARY OPEN ANGLE GLAUCOMA (POAG) OF RIGHT EYE, MILD STAGE: ICD-10-CM

## 2024-03-14 DIAGNOSIS — H40.1123 PRIMARY OPEN ANGLE GLAUCOMA (POAG) OF LEFT EYE, SEVERE STAGE: ICD-10-CM

## 2024-03-14 DIAGNOSIS — H25.813 COMBINED FORM OF AGE-RELATED CATARACT, BOTH EYES: Primary | ICD-10-CM

## 2024-03-14 PROCEDURE — 92012 INTRM OPH EXAM EST PATIENT: CPT | Performed by: OPHTHALMOLOGY

## 2024-03-14 ASSESSMENT — VISUAL ACUITY
OS_PH_SC: 20/30-1
METHOD: SNELLEN - LINEAR
OS_SC: 20/50
OD_PH_SC: 20/30
OS_PH_SC+: +1
OD_PH_SC+: -2
OD_SC+: -2
OD_SC: 20/40
OS_SC+: -2

## 2024-03-14 ASSESSMENT — TONOMETRY
OD_IOP_MMHG: 16
IOP_METHOD: APPLANATION
OS_IOP_MMHG: 16

## 2024-03-14 ASSESSMENT — SLIT LAMP EXAM - LIDS
COMMENTS: 2+ DERMATOCHALASIS
COMMENTS: 2+ DERMATOCHALASIS

## 2024-03-14 ASSESSMENT — EXTERNAL EXAM - LEFT EYE: OS_EXAM: NORMAL

## 2024-03-14 ASSESSMENT — EXTERNAL EXAM - RIGHT EYE: OD_EXAM: NORMAL

## 2024-03-14 NOTE — LETTER
"    3/14/2024         RE: Keyshawn Potter  [address]  [place] [address]        Dear Colleague,    Thank you for referring your patient, Keyshawn Potter, to the [facility]. Please see a copy of my visit note below.     Current Eye Medications:    Latanoprost both eyes every evening   Cosopt both eyes twice a day      Subjective: Here for intraocular pressure check. Patient complains of gradual decrease in vision at distance and near. Most problematic at night. Patient no longer drives at night. No eye pain or discomfort. Using drops as directed.      Objective:  See Ophthalmology Exam.       Assessment:  Stable intraocular pressure both eyes in patient with advanced open angle glaucoma left eye, mild open angle glaucoma right eye, and visually significant cataract both eyes.      Plan:  Referral sent to Dr. Pineda at the [facility] for a consultation for combined cataract and glaucoma surgery. His office will call you to schedule an appointment.     Continue:   Latanoprost (green top) every evening both eyes.  Cosopt (dorzolamide-timolol -- dark blue top) twice daily both eyes. About 12 hours apart.     May use artificial tears up to four times a day (like Refresh Optive, Systane Balance, or TheraTears. Avoid \"get the red out\" drops and generic artifical tears).     Wait at least 5 minutes between drops if using more than one at a time.     Prabhjot Alonso M.D.  [phone]         Again, thank you for allowing me to participate in the care of your patient.        Sincerely,        Prabhjot Alonso MD  "

## 2024-03-14 NOTE — PATIENT INSTRUCTIONS
"Referral sent to Dr. Pineda at the [facility] for a consultation for combined cataract and glaucoma surgery. His office will call you to schedule an appointment.     Continue:   Latanoprost (green top) every evening both eyes.  Cosopt (dorzolamide-timolol -- dark blue top) twice daily both eyes. About 12 hours apart.     May use artificial tears up to four times a day (like Refresh Optive, Systane Balance, or TheraTears. Avoid \"get the red out\" drops and generic artifical tears).     Wait at least 5 minutes between drops if using more than one at a time.     Prabhjot Alonso M.D.  [phone]    "

## 2024-03-14 NOTE — PROGRESS NOTES
" Current Eye Medications:    Latanoprost both eyes every evening   Cosopt both eyes twice a day      Subjective: Here for intraocular pressure check. Patient complains of gradual decrease in vision at distance and near. Most problematic at night. Patient no longer drives at night. No eye pain or discomfort. Using drops as directed.      Objective:  See Ophthalmology Exam.       Assessment:  Stable intraocular pressure both eyes in patient with advanced open angle glaucoma left eye, mild open angle glaucoma right eye, and visually significant cataract both eyes.      Plan:  Referral sent to Dr. Pineda at the [facility] for a consultation for combined cataract and glaucoma surgery. His office will call you to schedule an appointment.     Continue:   Latanoprost (green top) every evening both eyes.  Cosopt (dorzolamide-timolol -- dark blue top) twice daily both eyes. About 12 hours apart.     May use artificial tears up to four times a day (like Refresh Optive, Systane Balance, or TheraTears. Avoid \"get the red out\" drops and generic artifical tears).     Wait at least 5 minutes between drops if using more than one at a time.     Prabhjot Alonso M.D.  [phone]       "

## 2024-03-19 ENCOUNTER — VIRTUAL VISIT (OUTPATIENT)
Dept: FAMILY MEDICINE | Facility: CLINIC | Age: 79
End: 2024-03-19
Payer: COMMERCIAL

## 2024-03-19 DIAGNOSIS — R39.9 LOWER URINARY TRACT SYMPTOMS (LUTS): Primary | ICD-10-CM

## 2024-03-19 DIAGNOSIS — R97.20 ELEVATED PROSTATE SPECIFIC ANTIGEN (PSA): ICD-10-CM

## 2024-03-19 PROCEDURE — 99442 PR PHYSICIAN TELEPHONE EVALUATION 11-20 MIN: CPT | Mod: 93 | Performed by: NURSE PRACTITIONER

## 2024-03-19 RX ORDER — TERAZOSIN 1 MG/1
CAPSULE ORAL
Qty: 70 CAPSULE | Refills: 0 | Status: SHIPPED | OUTPATIENT
Start: 2024-03-19 | End: 2024-06-07

## 2024-03-19 NOTE — PROGRESS NOTES
Keyshawn is a 78 year old who is being evaluated via a billable telephone visit.    What phone number would you like to be contacted at? [phone]  How would you like to obtain your AVS? Mail a copy  Originating Location (pt. Location): Home    Distant Location (provider location):  Off-site    Assessment & Plan     Lower urinary tract symptoms (LUTS)    - Adult Urology  Referral; Future  - terazosin (HYTRIN) 1 MG capsule; Take 1 capsule (1 mg) by mouth daily for 7 days, THEN 2 capsules (2 mg) daily for 7 days, THEN 3 capsules (3 mg) daily for 7 days, THEN 4 capsules (4 mg) daily for 7 days.    Elevated prostate specific antigen (PSA)    - Adult Urology  Referral; Future  - terazosin (HYTRIN) 1 MG capsule; Take 1 capsule (1 mg) by mouth daily for 7 days, THEN 2 capsules (2 mg) daily for 7 days, THEN 3 capsules (3 mg) daily for 7 days, THEN 4 capsules (4 mg) daily    Flomax 0.8 mg was not effective.  He will try Terazosin as above; Discussed with patient the indication and use of medication(s), risks/benefits, and potential adverse side effects.  Patient/guardian verbalized understanding and agreement with the plan.  Strongly recommend patient to follow up with Urology and patient states he will call; I gave patient phone number to schedule with Urology.                  Subjective   Keyshawn is a 78 year old, presenting for the following health issues:  Urinary Problem and Recheck Medication        3/19/2024   1:07 PM   Additional Questions   Roomed by Lynnette THORNTON      History of Present Illness       Reason for visit:  Urinary symptoms- medication check    He eats 4 or more servings of fruits and vegetables daily.He consumes 0 sweetened beverage(s) daily.He exercises with enough effort to increase his heart rate 20 to 29 minutes per day.  He exercises with enough effort to increase his heart rate 3 or less days per week.   He is taking medications regularly.       Medication Followup of Tamsulosin 0.4  mg capsule - Taking two capsules at night  Taking Medication as prescribed: yes  Side Effects:  None  Medication Helping Symptoms:  NO- doesn't feel medication is doing anything and would like to talk about alternative     He can deal with the urinary incontinence at home but when he goes out he gets bothered by urine leaking down his leg an hour after leaving his house.  Has h/o mildly elevated PSA and to date he has declined going to see Urology.          Objective           Vitals:  No vitals were obtained today due to virtual visit.    Physical Exam   General: Alert and no distress //Respiratory: No audible wheeze, cough, or shortness of breath // Psychiatric:  Appropriate affect, tone, and pace of words          Phone call duration: 17 minutes  Signed Electronically by: Yazmin Trivedi NP

## 2024-03-23 NOTE — TELEPHONE ENCOUNTER
Action 2024 JTV 3:07 PM    Action Taken CSS sent an urgent request to [facility] for images.      Action 2024 JTV 12:12 PM    Action Taken CSS sent another request to [facility] for images.    MEDICAL RECORDS REQUEST   [facility]  [address] [place] [address]  PHONE: [phone]  Fax: [phone]        FUTURE VISIT INFORMATION                                                   Keyshawn Potter, : 1945 scheduled for future visit at [facility]    APPOINTMENT INFORMATION:  Date: 2024  Provider:  Dev Pacheco PA-C  Reason for Visit/Diagnosis: Lower urinary tract symptoms (LUTS), Elevated prostate specific antigen (PSA)    REFERRAL INFORMATION:  Referring provider:  Yazmin Trivedi NP in [facility]/      RECORDS REQUESTED FOR VISIT                                                     NOTES  STATUS/DETAILS   OFFICE NOTE from referring provider  yes, 2024, 2024 -- Yazmin Trivedi NP in [facility]   OFFICE NOTE from other specialist  yes   MEDICATION LIST  yes   LABS     PSA  yes   IMAGES  yes, [facility]  2022 -- MR ABD  2022 -- US ABD  2022 -- CT ABD PELVIS     PRE-VISIT CHECKLIST      Joint diagnostic appointment coordinated correctly          (ensure right order & amount of time) Yes   RECORD COLLECTION COMPLETE yes

## 2024-04-01 DIAGNOSIS — H40.1111 PRIMARY OPEN ANGLE GLAUCOMA (POAG) OF RIGHT EYE, MILD STAGE: ICD-10-CM

## 2024-04-01 DIAGNOSIS — H40.1123 PRIMARY OPEN ANGLE GLAUCOMA (POAG) OF LEFT EYE, SEVERE STAGE: ICD-10-CM

## 2024-04-01 RX ORDER — DORZOLAMIDE HYDROCHLORIDE AND TIMOLOL MALEATE 20; 5 MG/ML; MG/ML
1 SOLUTION/ DROPS OPHTHALMIC 2 TIMES DAILY
Qty: 20 ML | Refills: 3 | Status: SHIPPED | OUTPATIENT
Start: 2024-04-01

## 2024-04-02 DIAGNOSIS — H40.1111 PRIMARY OPEN ANGLE GLAUCOMA (POAG) OF RIGHT EYE, MILD STAGE: ICD-10-CM

## 2024-04-02 DIAGNOSIS — H40.1123 PRIMARY OPEN ANGLE GLAUCOMA (POAG) OF LEFT EYE, SEVERE STAGE: ICD-10-CM

## 2024-04-02 NOTE — TELEPHONE ENCOUNTER
[id number] faxed a Refill Request for latanoprost (XALATAN) 0.005 % ophthalmic solution     PHARMACY NOTES TO PRESCRIBER:  Pt. needs new RX.

## 2024-04-03 RX ORDER — LATANOPROST 50 UG/ML
1 SOLUTION/ DROPS OPHTHALMIC EVERY EVENING
Qty: 2.5 ML | Refills: 11 | Status: SHIPPED | OUTPATIENT
Start: 2024-04-03

## 2024-06-01 DIAGNOSIS — E11.65 TYPE 2 DIABETES MELLITUS WITH HYPERGLYCEMIA, WITHOUT LONG-TERM CURRENT USE OF INSULIN (H): ICD-10-CM

## 2024-06-03 ENCOUNTER — PRE VISIT (OUTPATIENT)
Dept: UROLOGY | Facility: CLINIC | Age: 79
End: 2024-06-03

## 2024-06-03 RX ORDER — METFORMIN HCL 500 MG
TABLET, EXTENDED RELEASE 24 HR ORAL
Qty: 180 TABLET | Refills: 0 | Status: SHIPPED | OUTPATIENT
Start: 2024-06-03 | End: 2024-09-05

## 2024-06-06 ENCOUNTER — NURSE TRIAGE (OUTPATIENT)
Dept: FAMILY MEDICINE | Facility: CLINIC | Age: 79
End: 2024-06-06
Payer: COMMERCIAL

## 2024-06-06 DIAGNOSIS — R31.9 HEMATURIA, UNSPECIFIED TYPE: Primary | ICD-10-CM

## 2024-06-06 DIAGNOSIS — E11.65 TYPE 2 DIABETES MELLITUS WITH HYPERGLYCEMIA, WITHOUT LONG-TERM CURRENT USE OF INSULIN (H): ICD-10-CM

## 2024-06-06 NOTE — TELEPHONE ENCOUNTER
Patient reports there is blood in urine     Reports slight red tinged urine    Denies clots; happened x2 today     No other urinary symptoms; no flank/back pain    Fever reported yesterday; now gone     Patient scheduled with PCP for tomorrow.    Ok to wait until tomorrow to be seen?     Do you want ua prior?    Reason for Disposition   All other patients with blood in urine  (Exception: Could be normal menstrual bleeding.)    Additional Information   Negative: Shock suspected (e.g., cold/pale/clammy skin, too weak to stand, low BP, rapid pulse)   Negative: Sounds like a life-threatening emergency to the triager   Negative: Urinary catheter, questions about   Negative: Recent back or abdominal injury   Negative: Recent genital injury   Negative: Unable to urinate (or only a few drops) > 4 hours and bladder feels very full (e.g., palpable bladder or strong urge to urinate)   Negative: Diffuse (all over) muscle pains in the shoulders, arms, legs, and back and dark (cola or tea-colored) or red-colored urine   Negative: Passing pure blood or large blood clots (i.e., larger than a dime or grape)   Negative: Fever > 100.4 F (38.0 C)   Negative: Patient sounds very sick or weak to the triager   Negative: Known sickle cell disease   Negative: Taking Coumadin (warfarin) or other strong blood thinner, or known bleeding disorder (e.g., thrombocytopenia)   Negative: Side (flank) or back pain present   Negative: Pain or burning with passing urine (urination)   Negative: Patient wants to be seen   Negative: Pink or red-colored urine and likely from food (beets, rhubarb, red food dye) and lasts > 24 hours after stopping food    Protocols used: Urine - Blood In-A-OH    Subha Meyer RN  [facility]

## 2024-06-06 NOTE — TELEPHONE ENCOUNTER
Reviewed triage note below and will plan to follow up with UA at appointment tomorrow.  Future orders placed in anticipation of appointment tomorrow.    Yazmin Trivedi, DNP, APRN, CNP

## 2024-06-07 ENCOUNTER — OFFICE VISIT (OUTPATIENT)
Dept: FAMILY MEDICINE | Facility: CLINIC | Age: 79
End: 2024-06-07
Payer: COMMERCIAL

## 2024-06-07 VITALS
WEIGHT: 226 LBS | OXYGEN SATURATION: 94 % | SYSTOLIC BLOOD PRESSURE: 124 MMHG | TEMPERATURE: 98.3 F | HEART RATE: 76 BPM | BODY MASS INDEX: 34.25 KG/M2 | RESPIRATION RATE: 16 BRPM | HEIGHT: 68 IN | DIASTOLIC BLOOD PRESSURE: 72 MMHG

## 2024-06-07 DIAGNOSIS — E11.65 TYPE 2 DIABETES MELLITUS WITH HYPERGLYCEMIA, WITHOUT LONG-TERM CURRENT USE OF INSULIN (H): ICD-10-CM

## 2024-06-07 DIAGNOSIS — N18.32 CHRONIC KIDNEY DISEASE, STAGE 3B (H): ICD-10-CM

## 2024-06-07 DIAGNOSIS — I10 ESSENTIAL HYPERTENSION: ICD-10-CM

## 2024-06-07 DIAGNOSIS — R31.9 HEMATURIA, UNSPECIFIED TYPE: Primary | ICD-10-CM

## 2024-06-07 PROBLEM — K43.9 VENTRAL HERNIA WITHOUT OBSTRUCTION OR GANGRENE: Status: ACTIVE | Noted: 2024-06-07

## 2024-06-07 LAB
ALBUMIN UR-MCNC: 100 MG/DL
APPEARANCE UR: CLEAR
BACTERIA #/AREA URNS HPF: ABNORMAL /HPF
BILIRUB UR QL STRIP: ABNORMAL
CAOX CRY #/AREA URNS HPF: ABNORMAL /HPF
COLOR UR AUTO: ABNORMAL
GLUCOSE UR STRIP-MCNC: 100 MG/DL
HGB UR QL STRIP: NEGATIVE
KETONES UR STRIP-MCNC: 15 MG/DL
LEUKOCYTE ESTERASE UR QL STRIP: NEGATIVE
MUCOUS THREADS #/AREA URNS LPF: PRESENT /LPF
NITRATE UR QL: NEGATIVE
PH UR STRIP: 5.5 [PH] (ref 5–7)
RBC #/AREA URNS AUTO: ABNORMAL /HPF
SP GR UR STRIP: 1.02 (ref 1–1.03)
SQUAMOUS #/AREA URNS AUTO: ABNORMAL /LPF
UROBILINOGEN UR STRIP-ACNC: 4 E.U./DL
WBC #/AREA URNS AUTO: ABNORMAL /HPF

## 2024-06-07 PROCEDURE — 99214 OFFICE O/P EST MOD 30 MIN: CPT | Performed by: NURSE PRACTITIONER

## 2024-06-07 PROCEDURE — 82043 UR ALBUMIN QUANTITATIVE: CPT | Performed by: NURSE PRACTITIONER

## 2024-06-07 PROCEDURE — G2211 COMPLEX E/M VISIT ADD ON: HCPCS | Performed by: NURSE PRACTITIONER

## 2024-06-07 PROCEDURE — 81001 URINALYSIS AUTO W/SCOPE: CPT | Performed by: NURSE PRACTITIONER

## 2024-06-07 PROCEDURE — 82570 ASSAY OF URINE CREATININE: CPT | Performed by: NURSE PRACTITIONER

## 2024-06-07 RX ORDER — AMLODIPINE AND BENAZEPRIL HYDROCHLORIDE 5; 10 MG/1; MG/1
1 CAPSULE ORAL DAILY
Qty: 90 CAPSULE | Refills: 3 | Status: SHIPPED | OUTPATIENT
Start: 2024-06-07

## 2024-06-07 RX ORDER — RESPIRATORY SYNCYTIAL VIRUS VACCINE 120MCG/0.5
0.5 KIT INTRAMUSCULAR ONCE
Qty: 1 EACH | Refills: 0 | Status: CANCELLED | OUTPATIENT
Start: 2024-06-07 | End: 2024-06-07

## 2024-06-07 ASSESSMENT — PAIN SCALES - GENERAL: PAINLEVEL: NO PAIN (0)

## 2024-06-07 NOTE — PROGRESS NOTES
"  Assessment & Plan     Hematuria, unspecified type  No blood or RBCs on urine analysis today.  States he took 975 mg of aspirin for a fever a couple days ago and then subsequently had blood in urine.  I suspect that that could have been the cause of the blood in urine.  There is no blood or RBCs on urine analysis today which is reassuring.  I asked patient that if the blood in the urine returns and he should follow-up.  - UA with Microscopic reflex to Culture - lab collect  - UA Microscopic with Reflex to Culture    Type 2 diabetes mellitus with hyperglycemia, without long-term current use of insulin (H)  monitor  - Albumin Random Urine Quantitative with Creat Ratio  - Basic metabolic panel  (Ca, Cl, CO2, Creat, Gluc, K, Na, BUN)  - Hemoglobin A1c  - blood glucose (NO BRAND SPECIFIED) test strip; Use to test blood sugar 1 times daily or as directed. To accompany: Blood Glucose Monitor Brands: per insurance.    Essential hypertension  Chronic, stable, continue current treatment.   - Basic metabolic panel  (Ca, Cl, CO2, Creat, Gluc, K, Na, BUN)  - amLODIPine-benazepril (LOTREL) 5-10 MG capsule; Take 1 capsule by mouth daily    Chronic kidney disease, stage 3b (H)  monitor  - Albumin Random Urine Quantitative with Creat Ratio  - Basic metabolic panel  (Ca, Cl, CO2, Creat, Gluc, K, Na, BUN)          BMI  Estimated body mass index is 34.87 kg/m  as calculated from the following:    Height as of this encounter: 1.715 m (5' 7.5\").    Weight as of this encounter: 102.5 kg (226 lb).           Bjorn Mahajan is a 78 year old, presenting for the following health issues:  Urinary Problem (Blood in urine 2 days )        6/7/2024     2:01 PM   Additional Questions   Roomed by Keena NAVARRO     History of Present Illness       Reason for visit:  Blood in urine  Symptom onset:  1-3 days ago  Symptoms include:  Red  Symptom intensity:  Mild  Symptom progression:  Staying the same  Had these symptoms before:  Yes  Has tried/received " "treatment for these symptoms:  Yes  Previous treatment was successful:  No  What makes it worse:  No  What makes it better:  Aspirin - used 3 days ago    He eats 0-1 servings of fruits and vegetables daily.He consumes 0 sweetened beverage(s) daily.He exercises with enough effort to increase his heart rate 60 or more minutes per day.  He exercises with enough effort to increase his heart rate 7 days per week.   He is taking medications regularly.     Nurse triage telephone note from yesterday copied here -    \"Patient reports there is blood in urine   Reports slight red tinged urine  Denies clots; happened x2 today   No other urinary symptoms; no flank/back pain  Fever reported yesterday; now gone\"        Had a fever 3 days ago took Aspirin but states it was three tablets of the 325 mg which is total dose of 975 mg Aspirin.  He then noticed the blood in urine after that.  Urinary incontinence he is also having but this has been a chronic issue.  Nighttime 1-2 per day.  One Flomax daily he is taking that has helped decrease the frequency of urination during the night to 1-2 times per night.  No flank pain.  He did have some upper abdomen discomfort but attributes this to his ventral hernia.            Objective    /72 (BP Location: Right arm, Patient Position: Sitting, Cuff Size: Adult Regular)   Pulse 76   Temp 98.3  F (36.8  C) (Oral)   Resp 16   Ht 1.715 m (5' 7.5\")   Wt 102.5 kg (226 lb)   SpO2 94%   BMI 34.87 kg/m    Body mass index is 34.87 kg/m .  Physical Exam   GENERAL: healthy, alert, and no distress  CV: regular rates and rhythm, normal S1 S2, no S3 or S4, and no murmur, click or rub  ABDOMEN: soft, nontender and ventral hernia is non tender, no redness or warmth  PSYCH: mentation appears normal, affect normal/bright    Results for orders placed or performed in visit on 06/07/24   UA with Microscopic reflex to Culture - lab collect     Status: Abnormal    Specimen: Urine, Clean Catch   Result " Value Ref Range    Color Urine Dark Yellow (A) Colorless, Straw, Light Yellow, Yellow    Appearance Urine Clear Clear    Glucose Urine 100 (A) Negative mg/dL    Bilirubin Urine Moderate (A) Negative    Ketones Urine 15 (A) Negative mg/dL    Specific Gravity Urine 1.025 1.003 - 1.035    Blood Urine Negative Negative    pH Urine 5.5 5.0 - 7.0    Protein Albumin Urine 100 (A) Negative mg/dL    Urobilinogen Urine 4.0 (A) 0.2, 1.0 E.U./dL    Nitrite Urine Negative Negative    Leukocyte Esterase Urine Negative Negative   UA Microscopic with Reflex to Culture     Status: Abnormal   Result Value Ref Range    Bacteria Urine Few (A) None Seen /HPF    RBC Urine 0-2 0-2 /HPF /HPF    WBC Urine 0-5 0-5 /HPF /HPF    Squamous Epithelials Urine Few (A) None Seen /LPF    Mucus Urine Present (A) None Seen /LPF    Calcium Oxalate Crystals Urine Moderate (A) None Seen /HPF    Narrative    Urine Culture not indicated           Signed Electronically by: Yazmin Trivedi NP

## 2024-06-07 NOTE — LETTER
Dorinda 10, 2024      Keyshawn Potter  [address] [facility] [address]        Dear ,    Your urine albumin level has risen a bit.  Recommend working on maintaining good Blood Pressure and blood sugars.  Also recommend avoiding taking over the counter Nonsteroidal Anti-Inflammatory Drugs (Ibuprofen, Motrin, Advil, Aleve, Naproxen, Naprosyn), instead if you need over the counter medication for pain or fever take Acetaminophen (Tylenol) is a better choice.       Chronic kidney disease (CKD) is the permanent loss of kidney function.  When the kidneys are damaged, they do not remove wastes and extra water from the blood as well as they should.  The most common causes of CKD are high blood pressure, diabetes and heart disease.   It also can run in families, so you may be at higher risk if you have a blood relative with kidney failure.     CKD is a silent condition and develops so slowly that most people do not realize they are sick until the disease is advanced.  If we can find CKD early, we can prevent or delay kidney failure and prevent heart disease.   Stages of CKD:  There are five stages of chronic kidney disease.  Your stage of kidney damage is based on the presence of kidney damage and your glomerular filtration rate (GFR), which is a measure of your level of kidney function.     Things you can do to slow down or avoid kidney failure:   -If you have diabetes, control blood sugar.  Studies show that keeping tight control of blood sugar can delay or prevent kidney failure   -If you have high blood pressure, it is important to lower your blood pressure.  Medications called ACE (angiotensin-converting enzyme) inhibitors or ARBs (angiotensin receptor blockers) are used to keep your kidney disease from getting worse.   -Be active by including exercise in your daily routine.   -Eat a well balanced diet.   We may have you watch the amount of protein you eat.  Too much protein can make the kidneys work too hard.    -Quit smoking.  Smoking damages the kidneys.  It also raises blood pressure.     For more information on Chronic Kidney Disease, please see the National Kidney Foundation www.kidney.org.     Resulted Orders   Albumin Random Urine Quantitative with Creat Ratio   Result Value Ref Range    Creatinine Urine mg/dL 263.0 mg/dL      Comment:      The reference ranges have not been established in urine creatinine. The results should be integrated into the clinical context for interpretation.    Albumin Urine mg/L 211.0 mg/L      Comment:      The reference ranges have not been established in urine albumin. The results should be integrated into the clinical context for interpretation.    Albumin Urine mg/g Cr 80.23 (H) 0.00 - 17.00 mg/g Cr      Comment:      Microalbuminuria is defined as an albumin:creatinine ratio of 17 to 299 for males and 25 to 299 for females. A ratio of albumin:creatinine of 300 or higher is indicative of overt proteinuria.  Due to biologic variability, positive results should be confirmed by a second, first-morning random or 24-hour timed urine specimen. If there is discrepancy, a third specimen is recommended. When 2 out of 3 results are in the microalbuminuria range, this is evidence for incipient nephropathy and warrants increased efforts at glucose control, blood pressure control, and institution of therapy with an angiotensin-converting-enzyme (ACE) inhibitor (if the patient can tolerate it).     UA with Microscopic reflex to Culture - lab collect   Result Value Ref Range    Color Urine Dark Yellow (A) Colorless, Straw, Light Yellow, Yellow    Appearance Urine Clear Clear    Glucose Urine 100 (A) Negative mg/dL    Bilirubin Urine Moderate (A) Negative    Ketones Urine 15 (A) Negative mg/dL    Specific Gravity Urine 1.025 1.003 - 1.035    Blood Urine Negative Negative    pH Urine 5.5 5.0 - 7.0    Protein Albumin Urine 100 (A) Negative mg/dL    Urobilinogen Urine 4.0 (A) 0.2, 1.0 E.U./dL     Nitrite Urine Negative Negative    Leukocyte Esterase Urine Negative Negative   UA Microscopic with Reflex to Culture   Result Value Ref Range    Bacteria Urine Few (A) None Seen /HPF    RBC Urine 0-2 0-2 /HPF /HPF    WBC Urine 0-5 0-5 /HPF /HPF    Squamous Epithelials Urine Few (A) None Seen /LPF    Mucus Urine Present (A) None Seen /LPF    Calcium Oxalate Crystals Urine Moderate (A) None Seen /HPF    Narrative    Urine Culture not indicated                   If you have any questions or concerns, please call the clinic at the number listed above.       Sincerely,      Yazmin Trivedi, NP/mv

## 2024-06-08 LAB
CREAT UR-MCNC: 263 MG/DL
MICROALBUMIN UR-MCNC: 211 MG/L
MICROALBUMIN/CREAT UR: 80.23 MG/G CR (ref 0–17)

## 2024-08-12 ENCOUNTER — TELEPHONE (OUTPATIENT)
Dept: OPHTHALMOLOGY | Facility: CLINIC | Age: 79
End: 2024-08-12
Payer: COMMERCIAL

## 2024-08-12 NOTE — TELEPHONE ENCOUNTER
Spoke to patient. He does not want to go to Dr. Pineda at the [facility] for a combined glaucoma/cataract procedure.  Dr. Alonso placed a referral to the [place] [facility] in [place] for an eval with Dr. Alma Gastelum.  He could also see Dr. Fred Ryan in the [place] location.   If these do not work out, he wants to go to [place]. Has signed a release of records and it is in [facility] for [facility].

## 2024-08-12 NOTE — TELEPHONE ENCOUNTER
Health Call Center    Phone Message    May a detailed message be left on voicemail: yes     Reason for Call: Other: per pt requesting a new referral as pt is not wanting to go to [place] at the [facility] for surgery, pt has requested his records to be sent to [facility]  and they have not been sent to [place]. Please contact pt to discuss options Thank you! Pt is wanting to know what is the glaucoma surgery side of this from Dr Alonso     Action Taken: Message routed to:  [facility]    Travel Screening: Not Applicable     Date of Service:

## 2024-09-03 ENCOUNTER — TELEPHONE (OUTPATIENT)
Dept: FAMILY MEDICINE | Facility: CLINIC | Age: 79
End: 2024-09-03
Payer: COMMERCIAL

## 2024-09-03 DIAGNOSIS — E11.65 TYPE 2 DIABETES MELLITUS WITH HYPERGLYCEMIA, WITHOUT LONG-TERM CURRENT USE OF INSULIN (H): ICD-10-CM

## 2024-09-05 ENCOUNTER — TRANSFERRED RECORDS (OUTPATIENT)
Dept: HEALTH INFORMATION MANAGEMENT | Facility: CLINIC | Age: 79
End: 2024-09-05
Payer: COMMERCIAL

## 2024-09-05 RX ORDER — METFORMIN HCL 500 MG
TABLET, EXTENDED RELEASE 24 HR ORAL
Qty: 60 TABLET | Refills: 0 | Status: SHIPPED | OUTPATIENT
Start: 2024-09-05

## 2024-09-05 RX ORDER — METFORMIN HCL 500 MG
TABLET, EXTENDED RELEASE 24 HR ORAL
Qty: 180 TABLET | Refills: 0 | OUTPATIENT
Start: 2024-09-05

## 2024-09-05 NOTE — TELEPHONE ENCOUNTER
This patient is overdue for advised follow up, which is why he/she is out of refills.  I do not want this patient to go without medication - so one refill has been provided to allow time for appointment scheduling.  Please notify the patient and assist with scheduling follow up within the month.    Yazmin Trivedi, DNP, APRN, CNP

## 2024-09-05 NOTE — TELEPHONE ENCOUNTER
Pt states that he just had an appt in June he knows that he's due fro physical in dec, will make appt closer to that month

## 2024-09-06 ENCOUNTER — OFFICE VISIT (OUTPATIENT)
Dept: FAMILY MEDICINE | Facility: CLINIC | Age: 79
End: 2024-09-06
Payer: COMMERCIAL

## 2024-09-06 VITALS
RESPIRATION RATE: 20 BRPM | BODY MASS INDEX: 34.46 KG/M2 | WEIGHT: 227.4 LBS | DIASTOLIC BLOOD PRESSURE: 76 MMHG | HEIGHT: 68 IN | HEART RATE: 76 BPM | OXYGEN SATURATION: 97 % | SYSTOLIC BLOOD PRESSURE: 128 MMHG | TEMPERATURE: 97.2 F

## 2024-09-06 DIAGNOSIS — H40.1111 PRIMARY OPEN ANGLE GLAUCOMA (POAG) OF RIGHT EYE, MILD STAGE: ICD-10-CM

## 2024-09-06 DIAGNOSIS — H26.9 CATARACT OF BOTH EYES, UNSPECIFIED CATARACT TYPE: ICD-10-CM

## 2024-09-06 DIAGNOSIS — H40.1123 PRIMARY OPEN ANGLE GLAUCOMA (POAG) OF LEFT EYE, SEVERE STAGE: ICD-10-CM

## 2024-09-06 DIAGNOSIS — Z01.818 PREOP GENERAL PHYSICAL EXAM: Primary | ICD-10-CM

## 2024-09-06 PROCEDURE — 99214 OFFICE O/P EST MOD 30 MIN: CPT | Performed by: PHYSICIAN ASSISTANT

## 2024-09-06 NOTE — PROGRESS NOTES
Preoperative Evaluation  [facility] [place]  [address]  [place] [address]  Phone: [phone]  Primary Provider: Yazmin Trivedi NP  Pre-op Performing Provider: DAO James  Sep 6, 2024           9/6/2024   Surgical Information   What procedure is being done? eyes (cataracts)   Facility or Hospital where procedure/surgery will be performed: [facility], [place]   Who is doing the procedure / surgery? Dr. Rees   Date of surgery / procedure: 9/9/24   Time of surgery / procedure: unknown   Where do you plan to recover after surgery? at home alone        Fax number for surgical facility: [phone]    Assessment & Plan     The proposed surgical procedure is considered LOW risk.    Problem List Items Addressed This Visit          Other    Primary open angle glaucoma (POAG) of left eye, severe stage    Primary open angle glaucoma (POAG) of right eye, mild stage     Other Visit Diagnoses       Preop general physical exam    -  Primary    Cataract of both eyes, unspecified cataract type                        - No identified additional risk factors other than previously addressed    Antiplatelet or Anticoagulation Medication Instructions   - Patient is on no antiplatelet or anticoagulation medications.    Additional Medication Instructions  Take all scheduled medications on the day of surgery EXCEPT for modifications listed below:   - ACE/ARB: DO NOT TAKE on day of surgery (minimum 11 hours for general anesthesia).   - metformin: DO NOT TAKE day of surgery.   - Herbal medications and vitamins: DO NOT TAKE 14 days prior to surgery.   - ibuprofen (Advil, Motrin): DO NOT TAKE 1 day before surgery.    - naproxen (Aleve, Naprosyn): DO NOT TAKE 4 days before surgery.     Recommendation  Approval given to proceed with proposed procedure, without further diagnostic evaluation.    Bjorn Mahajan is a 78 year old, presenting for the following:  Pre-Op Exam           9/6/2024     2:54 PM   Additional Questions   Roomed by Edita Dixon   Accompanied by None         9/6/2024     2:54 PM   Patient Reported Additional Medications   Patient reports taking the following new medications None     HPI related to upcoming procedure: needs cataract and glaucoma repair.        9/6/2024   Pre-Op Questionnaire   Have you ever had a heart attack or stroke? No   Have you ever had surgery on your heart or blood vessels, such as a stent placement, a coronary artery bypass, or surgery on an artery in your head, neck, heart, or legs? No   Do you have chest pain with activity? No   Do you have a history of heart failure? No   Do you currently have a cold, bronchitis or symptoms of other infection? No   Do you have a cough, shortness of breath, or wheezing? No   Do you or anyone in your family have previous history of blood clots? No   Do you or does anyone in your family have a serious bleeding problem such as prolonged bleeding following surgeries or cuts? No   Have you ever had problems with anemia or been told to take iron pills? No   Have you had any abnormal blood loss such as black, tarry or bloody stools? No   Have you ever had a blood transfusion? No   Are you willing to have a blood transfusion if it is medically needed before, during, or after your surgery? Yes   Have you or any of your relatives ever had problems with anesthesia? No   Do you have sleep apnea, excessive snoring or daytime drowsiness? No   Do you have any artifical heart valves or other implanted medical devices like a pacemaker, defibrillator, or continuous glucose monitor? No   Do you have artificial joints? No   Are you allergic to latex? No        Health Care Directive  Patient does not have a Health Care Directive or Living Will: Discussed advance care planning with patient; however, patient declined at this time.    Preoperative Review of    reviewed - no record of controlled substances prescribed.    Status of  Chronic Conditions:  DIABETES - Patient has a longstanding history of DiabetesType Type II . Patient is being treated with oral agents and denies significant side effects. Control has been good. Complicating factors include but are not limited to: hypertension and hyperlipidemia.     HYPERLIPIDEMIA - Patient has a long history of significant Hyperlipidemia requiring medication for treatment with recent good control. Patient reports no problems or side effects with the medication.     HYPERTENSION - Patient has longstanding history of HTN , currently denies any symptoms referable to elevated blood pressure. Specifically denies chest pain, palpitations, dyspnea, orthopnea, PND or peripheral edema. Blood pressure readings have been in normal range. Current medication regimen is as listed below. Patient denies any side effects of medication.     RENAL INSUFFICIENCY - Patient has a longstanding history of moderate-severe chronic renal insufficiency. Last Cr 1.44.     Patient Active Problem List    Diagnosis Date Noted    Ventral hernia without obstruction or gangrene 06/07/2024     Priority: Medium    Lower urinary tract symptoms (LUTS) 01/23/2024     Priority: Medium    Type 2 diabetes mellitus with stage 3b chronic kidney disease, without long-term current use of insulin (H) 01/23/2024     Priority: Medium    History of YAG laser iridotomy of both eyes 06/28/2023     Priority: Medium    Combined form of age-related cataract, mod, both eyes 06/28/2023     Priority: Medium    Primary open angle glaucoma (POAG) of left eye, severe stage 06/28/2023     Priority: Medium    Primary open angle glaucoma (POAG) of right eye, mild stage 06/28/2023     Priority: Medium    Pancreatic cyst 11/09/2022     Priority: Medium     Needs repeat MRI/MRCP in 6 months (due 5/2023) to follow up on pancreatic cyst      Chronic kidney disease, stage 3b (H) 01/04/2022     Priority: Medium    Elevated prostate specific antigen (PSA) 05/06/2021      Priority: Medium     [facility] Annotation: Dec 19 2011 11:04GARCIA - Martha Silvia: Pt has   seen urology for consult but declines biopsy.  He is aware of risk of   undiagnosed prostate cancer      Essential hypertension 05/06/2021     Priority: Medium    Neoplasm of uncertain behavior of skin 05/06/2021     Priority: Medium    NAFLD (nonalcoholic fatty liver disease) 05/06/2021     Priority: Medium    Type 2 diabetes mellitus with hyperglycemia, without long-term current use of insulin (H) 05/06/2021     Priority: Medium    Vitamin D deficiency 05/06/2021     Priority: Medium    Morbid obesity (H)      Priority: Medium     Formatting of this note might be different from the original.  Created by Conversion        Past Medical History:   Diagnosis Date    Chronic kidney disease, stage 3b (H) 01/04/2022    Diabetes (H)      No past surgical history on file.  Current Outpatient Medications   Medication Sig Dispense Refill    amLODIPine-benazepril (LOTREL) 5-10 MG capsule Take 1 capsule by mouth daily 90 capsule 3    blood glucose (NO BRAND SPECIFIED) test strip Use to test blood sugar 1 times daily or as directed. To accompany: Blood Glucose Monitor Brands: per insurance. 100 strip 11    blood glucose monitoring (SOFTCLIX) lancets       Coenzyme Q10 (CO Q-10) 30 MG CAPS Take 30 mg by mouth      dorzolamide-timolol (COSOPT) 2-0.5 % ophthalmic solution Place 1 drop into both eyes 2 times daily 20 mL 3    latanoprost (XALATAN) 0.005 % ophthalmic solution Place 1 drop into both eyes every evening Wait at least 5 minutes between drops if using more than one at a time. 2.5 mL 11    metFORMIN (GLUCOPHAGE XR) 500 MG 24 hr tablet TAKE 2 TABLETS BY MOUTH ONCE DAILY WITH SUPPER  Patient is due for office visit and/or labs before further refills. 60 tablet 0    pravastatin (PRAVACHOL) 10 MG tablet Take 1 tablet (10 mg) by mouth at bedtime 90 tablet 3    tamsulosin (FLOMAX) 0.4 MG capsule Take 2 capsules (0.8 mg) by mouth  "daily 180 capsule 0    aspirin (ASA) 81 MG EC tablet Take 81 mg by mouth (Patient not taking: Reported on 2024)      fluticasone (FLONASE) 50 MCG/ACT nasal spray Spray 1 spray into both nostrils daily (Patient not taking: Reported on 2024) 16 g 0    ibuprofen (ADVIL/MOTRIN) 800 MG tablet  (Patient not taking: Reported on 2024)         Allergies   Allergen Reactions    Cats     Dust Mites         Social History     Tobacco Use    Smoking status: Former     Current packs/day: 0.00     Types: Cigarettes     Start date: 1968     Quit date: 1978     Years since quittin.7     Passive exposure: Never    Smokeless tobacco: Never   Substance Use Topics    Alcohol use: Not Currently     No family history on file.  History   Drug Use Unknown             Review of Systems  Constitutional, neuro, ENT, endocrine, pulmonary, cardiac, gastrointestinal, genitourinary, musculoskeletal, integument and psychiatric systems are negative, except as otherwise noted.    Objective    /76   Pulse 76   Temp 97.2  F (36.2  C) (Temporal)   Resp 20   Ht 1.727 m (5' 8\")   Wt 103.1 kg (227 lb 6.4 oz)   SpO2 97%   BMI 34.58 kg/m     Estimated body mass index is 34.58 kg/m  as calculated from the following:    Height as of this encounter: 1.727 m (5' 8\").    Weight as of this encounter: 103.1 kg (227 lb 6.4 oz).  Physical Exam  Vitals and nursing note reviewed.   Constitutional:       General: He is not in acute distress.     Appearance: He is not ill-appearing or diaphoretic.   HENT:      Head: Normocephalic and atraumatic.      Mouth/Throat:      Mouth: Mucous membranes are moist.      Pharynx: Oropharynx is clear.   Eyes:      Conjunctiva/sclera: Conjunctivae normal.   Cardiovascular:      Rate and Rhythm: Normal rate and regular rhythm.      Heart sounds: Normal heart sounds. No murmur heard.     No friction rub. No gallop.   Pulmonary:      Effort: Pulmonary effort is normal. No respiratory distress.      " Breath sounds: Normal breath sounds. No stridor. No wheezing, rhonchi or rales.   Musculoskeletal:      Cervical back: Neck supple. No rigidity.   Lymphadenopathy:      Cervical: No cervical adenopathy.   Skin:     General: Skin is warm and dry.   Neurological:      General: No focal deficit present.      Mental Status: He is alert. Mental status is at baseline.   Psychiatric:         Mood and Affect: Mood normal.         Behavior: Behavior normal.         Recent Labs   Lab Test 12/11/23  0923   HGB 15.3      POTASSIUM 4.6   CR 1.44*   A1C 7.1*        Diagnostics  No labs were ordered during this visit.   No EKG required for low risk surgery (cataract, skin procedure, breast biopsy, etc).    Revised Cardiac Risk Index (RCRI)  The patient has the following serious cardiovascular risks for perioperative complications:   - No serious cardiac risks = 0 points     RCRI Interpretation: 0 points: Class I (very low risk - 0.4% complication rate)         Signed Electronically by: DAO James  A copy of this evaluation report is provided to the requesting physician.

## 2024-09-06 NOTE — PATIENT INSTRUCTIONS
Kellie Mahajan,    Thank you for allowing [facility] to manage your care.    If you have any questions or concerns, please feel free to call us at [phone]    Sincerely,    Spike Meek PA-C    Did you know?      You can schedule a video visit for follow-up appointments as well as future appointments for certain conditions.  Please see the below link.     [email or web address]    If you have not already done so,  I encourage you to sign up for [employer] ([email or web address]).  This will allow you to review your results, securely communicate with a provider, and schedule virtual visits as well.    How to Take Your Medication Before Surgery  Preoperative Medication Instructions   Additional Medication Instructions  Take all scheduled medications on the day of surgery EXCEPT for modifications listed below:   - Amlodipine/benazepril: DO NOT TAKE on day of surgery (minimum 11 hours for general anesthesia).   - metformin: DO NOT TAKE day of surgery.   - Herbal medications and vitamins: DO NOT TAKE 14 days prior to surgery.   - ibuprofen (Advil, Motrin): DO NOT TAKE 1 day before surgery.    - naproxen (Aleve, Naprosyn): DO NOT TAKE 4 days before surgery.        Patient Education   Preparing for Your Surgery  Getting started  A nurse will call you to review your health history and instructions. They will give you an arrival time based on your scheduled surgery time. Please be ready to share:  Your doctor's clinic name and phone number  Your medical, surgical, and anesthesia history  A list of allergies and sensitivities  A list of medicines, including herbal treatments and over-the-counter drugs  Whether the patient has a legal guardian (ask how to send us the papers in advance)  Please tell us if you're pregnant--or if there's any chance you might be pregnant. Some surgeries may injure a fetus (unborn baby), so they require a pregnancy test. Surgeries that are  safe for a fetus don't always need a test, and you can choose whether to have one.   If you have a child who's having surgery, please ask for a copy of Preparing for Your Child's Surgery.    Preparing for surgery  Within 10 to 30 days of surgery: Have a pre-op exam (sometimes called an H&P, or History and Physical). This can be done at a clinic or pre-operative center.  If you're having a , you may not need this exam. Talk to your care team.  At your pre-op exam, talk to your care team about all medicines you take. If you need to stop any medicines before surgery, ask when to start taking them again.  We do this for your safety. Many medicines can make you bleed too much during surgery. Some change how well surgery (anesthesia) drugs work.  Call your insurance company to let them know you're having surgery. (If you don't have insurance, call [phone].)  Call your clinic if there's any change in your health. This includes signs of a cold or flu (sore throat, runny nose, cough, rash, fever). It also includes a scrape or scratch near the surgery site.  If you have questions on the day of surgery, call your hospital or surgery center.  Eating and drinking guidelines  For your safety: Unless your surgeon tells you otherwise, follow the guidelines below.  Eat and drink as usual until 8 hours before you arrive for surgery. After that, no food or milk.  Drink clear liquids until 2 hours before you arrive. These are liquids you can see through, like water, Gatorade, and Propel Water. They also include plain black coffee and tea (no cream or milk), candy, and breath mints. You can spit out gum when you arrive.  If you drink alcohol: Stop drinking it the night before surgery.  If your care team tells you to take medicine on the morning of surgery, it's okay to take it with a sip of water.  Preventing infection  Shower or bathe the night before and morning of your surgery. Follow the instructions your clinic gave  you. (If no instructions, use regular soap.)  Don't shave or clip hair near your surgery site. We'll remove the hair if needed.  Don't smoke or vape the morning of surgery. You may chew nicotine gum up to 2 hours before surgery. A nicotine patch is okay.  Note: Some surgeries require you to completely quit smoking and nicotine. Check with your surgeon.  Your care team will make every effort to keep you safe from infection. We will:  Clean our hands often with soap and water (or an alcohol-based hand rub).  Clean the skin at your surgery site with a special soap that kills germs.  Give you a special gown to keep you warm. (Cold raises the risk of infection.)  Wear special hair covers, masks, gowns and gloves during surgery.  Give antibiotic medicine, if prescribed. Not all surgeries need antibiotics.  What to bring on the day of surgery  Photo ID and insurance card  Copy of your health care directive, if you have one  Glasses and hearing aids (bring cases)  You can't wear contacts during surgery  Inhaler and eye drops, if you use them (tell us about these when you arrive)  CPAP machine or breathing device, if you use them  A few personal items, if spending the night  If you have . . .  A pacemaker, ICD (cardiac defibrillator) or other implant: Bring the ID card.  An implanted stimulator: Bring the remote control.  A legal guardian: Bring a copy of the certified (court-stamped) guardianship papers.  Please remove any jewelry, including body piercings. Leave jewelry and other valuables at home.  If you're going home the day of surgery  You must have a responsible adult drive you home. They should stay with you overnight as well.  If you don't have someone to stay with you, and you aren't safe to go home alone, we may keep you overnight. Insurance often won't pay for this.  After surgery  If it's hard to control your pain or you need more pain medicine, please call your surgeon's office.  Questions?   If you have any  questions for your care team, list them here: _________________________________________________________________________________________________________________________________________________________________________ ____________________________________ ____________________________________ ____________________________________  For informational purposes only. Not to replace the advice of your health care provider. Copyright   2003, 2019 [facility]. All rights reserved. Clinically reviewed by Fartun Man MD. SMARTworks 321998 - REV 12/22.

## 2024-09-09 ENCOUNTER — TRANSFERRED RECORDS (OUTPATIENT)
Dept: HEALTH INFORMATION MANAGEMENT | Facility: CLINIC | Age: 79
End: 2024-09-09
Payer: COMMERCIAL

## 2024-09-24 DIAGNOSIS — H40.1123 PRIMARY OPEN ANGLE GLAUCOMA (POAG) OF LEFT EYE, SEVERE STAGE: Primary | ICD-10-CM

## 2024-09-24 RX ORDER — DORZOLAMIDE HYDROCHLORIDE AND TIMOLOL MALEATE 20; 5 MG/ML; MG/ML
1 SOLUTION/ DROPS OPHTHALMIC 2 TIMES DAILY
Qty: 30 ML | Refills: 4 | Status: SHIPPED | OUTPATIENT
Start: 2024-09-24

## 2024-09-24 NOTE — TELEPHONE ENCOUNTER
Received refill request for Cosopt BID both eyes.  Will send generic ok, 90 day supply ok. Patient said that the 20 mL was too expensive.  Also there is a note in the computer from [facility], from 9-5-24. They ordered Cosopt BID both eyes for him

## 2024-09-30 ENCOUNTER — TELEPHONE (OUTPATIENT)
Dept: FAMILY MEDICINE | Facility: CLINIC | Age: 79
End: 2024-09-30

## 2024-09-30 NOTE — TELEPHONE ENCOUNTER
Patient is calling and needing us to change his pharmacy in the system.  His new pharmacy should be [address].    BORA Bryan  [facility]
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
## 2024-10-03 ENCOUNTER — TRANSFERRED RECORDS (OUTPATIENT)
Dept: HEALTH INFORMATION MANAGEMENT | Facility: CLINIC | Age: 79
End: 2024-10-03
Payer: COMMERCIAL

## 2024-10-04 ENCOUNTER — TRANSFERRED RECORDS (OUTPATIENT)
Dept: HEALTH INFORMATION MANAGEMENT | Facility: CLINIC | Age: 79
End: 2024-10-04
Payer: COMMERCIAL

## 2024-10-10 ENCOUNTER — TELEPHONE (OUTPATIENT)
Dept: FAMILY MEDICINE | Facility: CLINIC | Age: 79
End: 2024-10-10
Payer: COMMERCIAL

## 2024-10-10 DIAGNOSIS — E11.65 TYPE 2 DIABETES MELLITUS WITH HYPERGLYCEMIA, WITHOUT LONG-TERM CURRENT USE OF INSULIN (H): ICD-10-CM

## 2024-10-10 RX ORDER — METFORMIN HYDROCHLORIDE 500 MG/1
TABLET, EXTENDED RELEASE ORAL
Qty: 60 TABLET | Refills: 0 | Status: SHIPPED | OUTPATIENT
Start: 2024-10-10

## 2024-10-14 ENCOUNTER — TRANSFERRED RECORDS (OUTPATIENT)
Dept: HEALTH INFORMATION MANAGEMENT | Facility: CLINIC | Age: 79
End: 2024-10-14
Payer: COMMERCIAL

## 2024-10-15 ENCOUNTER — TRANSFERRED RECORDS (OUTPATIENT)
Dept: HEALTH INFORMATION MANAGEMENT | Facility: CLINIC | Age: 79
End: 2024-10-15
Payer: COMMERCIAL

## 2024-10-17 ENCOUNTER — TRANSFERRED RECORDS (OUTPATIENT)
Dept: HEALTH INFORMATION MANAGEMENT | Facility: CLINIC | Age: 79
End: 2024-10-17
Payer: COMMERCIAL

## 2024-11-27 DIAGNOSIS — E78.5 HYPERLIPIDEMIA LDL GOAL <100: ICD-10-CM

## 2024-11-27 RX ORDER — PRAVASTATIN SODIUM 10 MG
10 TABLET ORAL AT BEDTIME
Qty: 90 TABLET | Refills: 0 | Status: SHIPPED | OUTPATIENT
Start: 2024-11-27

## 2025-03-25 DIAGNOSIS — E11.65 TYPE 2 DIABETES MELLITUS WITH HYPERGLYCEMIA, WITHOUT LONG-TERM CURRENT USE OF INSULIN (H): ICD-10-CM

## 2025-03-25 RX ORDER — METFORMIN HYDROCHLORIDE 500 MG/1
TABLET, EXTENDED RELEASE ORAL
Qty: 60 TABLET | Refills: 0 | OUTPATIENT
Start: 2025-03-25

## 2025-03-25 RX ORDER — METFORMIN HYDROCHLORIDE 500 MG/1
TABLET, EXTENDED RELEASE ORAL
Qty: 60 TABLET | Refills: 0 | Status: SHIPPED | OUTPATIENT
Start: 2025-03-25

## 2025-04-10 ENCOUNTER — OFFICE VISIT (OUTPATIENT)
Dept: FAMILY MEDICINE | Facility: CLINIC | Age: 80
End: 2025-04-10
Payer: COMMERCIAL

## 2025-04-10 VITALS
WEIGHT: 220.4 LBS | SYSTOLIC BLOOD PRESSURE: 168 MMHG | BODY MASS INDEX: 33.4 KG/M2 | DIASTOLIC BLOOD PRESSURE: 96 MMHG | OXYGEN SATURATION: 96 % | HEART RATE: 65 BPM | RESPIRATION RATE: 16 BRPM | HEIGHT: 68 IN | TEMPERATURE: 97.5 F

## 2025-04-10 DIAGNOSIS — R97.20 ELEVATED PROSTATE SPECIFIC ANTIGEN (PSA): ICD-10-CM

## 2025-04-10 DIAGNOSIS — N18.31 STAGE 3A CHRONIC KIDNEY DISEASE (H): ICD-10-CM

## 2025-04-10 DIAGNOSIS — E66.811 CLASS 1 OBESITY DUE TO EXCESS CALORIES WITH SERIOUS COMORBIDITY AND BODY MASS INDEX (BMI) OF 33.0 TO 33.9 IN ADULT: ICD-10-CM

## 2025-04-10 DIAGNOSIS — E11.65 TYPE 2 DIABETES MELLITUS WITH HYPERGLYCEMIA, WITHOUT LONG-TERM CURRENT USE OF INSULIN (H): Primary | ICD-10-CM

## 2025-04-10 DIAGNOSIS — H40.1123 PRIMARY OPEN ANGLE GLAUCOMA (POAG) OF LEFT EYE, SEVERE STAGE: ICD-10-CM

## 2025-04-10 DIAGNOSIS — E66.09 CLASS 1 OBESITY DUE TO EXCESS CALORIES WITH SERIOUS COMORBIDITY AND BODY MASS INDEX (BMI) OF 33.0 TO 33.9 IN ADULT: ICD-10-CM

## 2025-04-10 DIAGNOSIS — I10 ESSENTIAL HYPERTENSION: ICD-10-CM

## 2025-04-10 DIAGNOSIS — N18.31 TYPE 2 DIABETES MELLITUS WITH STAGE 3A CHRONIC KIDNEY DISEASE, WITHOUT LONG-TERM CURRENT USE OF INSULIN (H): ICD-10-CM

## 2025-04-10 DIAGNOSIS — E11.22 TYPE 2 DIABETES MELLITUS WITH STAGE 3A CHRONIC KIDNEY DISEASE, WITHOUT LONG-TERM CURRENT USE OF INSULIN (H): ICD-10-CM

## 2025-04-10 DIAGNOSIS — E78.5 HYPERLIPIDEMIA LDL GOAL <100: ICD-10-CM

## 2025-04-10 DIAGNOSIS — H40.1111 PRIMARY OPEN ANGLE GLAUCOMA (POAG) OF RIGHT EYE, MILD STAGE: ICD-10-CM

## 2025-04-10 LAB
ALBUMIN SERPL BCG-MCNC: 4.4 G/DL (ref 3.5–5.2)
ALP SERPL-CCNC: 88 U/L (ref 40–150)
ALT SERPL W P-5'-P-CCNC: 18 U/L (ref 0–70)
ANION GAP SERPL CALCULATED.3IONS-SCNC: 13 MMOL/L (ref 7–15)
AST SERPL W P-5'-P-CCNC: 28 U/L (ref 0–45)
BILIRUB SERPL-MCNC: 0.5 MG/DL
BUN SERPL-MCNC: 8.8 MG/DL (ref 8–23)
CALCIUM SERPL-MCNC: 9.2 MG/DL (ref 8.8–10.4)
CHLORIDE SERPL-SCNC: 101 MMOL/L (ref 98–107)
CHOLEST SERPL-MCNC: 154 MG/DL
CREAT SERPL-MCNC: 1.25 MG/DL (ref 0.67–1.17)
CREAT UR-MCNC: 108 MG/DL
EGFRCR SERPLBLD CKD-EPI 2021: 59 ML/MIN/1.73M2
EST. AVERAGE GLUCOSE BLD GHB EST-MCNC: 148 MG/DL
FASTING STATUS PATIENT QL REPORTED: YES
FASTING STATUS PATIENT QL REPORTED: YES
GLUCOSE SERPL-MCNC: 138 MG/DL (ref 70–99)
HBA1C MFR BLD: 6.8 % (ref 0–5.6)
HCO3 SERPL-SCNC: 21 MMOL/L (ref 22–29)
HDLC SERPL-MCNC: 35 MG/DL
HGB BLD-MCNC: 16.3 G/DL (ref 13.3–17.7)
LDLC SERPL CALC-MCNC: 62 MG/DL
MICROALBUMIN UR-MCNC: 312 MG/L
MICROALBUMIN/CREAT UR: 288.89 MG/G CR (ref 0–17)
NONHDLC SERPL-MCNC: 119 MG/DL
POTASSIUM SERPL-SCNC: 4.2 MMOL/L (ref 3.4–5.3)
PROT SERPL-MCNC: 7.5 G/DL (ref 6.4–8.3)
PSA SERPL DL<=0.01 NG/ML-MCNC: 7.39 NG/ML (ref 0–6.5)
SODIUM SERPL-SCNC: 135 MMOL/L (ref 135–145)
TRIGL SERPL-MCNC: 285 MG/DL

## 2025-04-10 PROCEDURE — 80053 COMPREHEN METABOLIC PANEL: CPT | Performed by: NURSE PRACTITIONER

## 2025-04-10 PROCEDURE — 82043 UR ALBUMIN QUANTITATIVE: CPT | Performed by: NURSE PRACTITIONER

## 2025-04-10 PROCEDURE — 82570 ASSAY OF URINE CREATININE: CPT | Performed by: NURSE PRACTITIONER

## 2025-04-10 PROCEDURE — 85018 HEMOGLOBIN: CPT | Performed by: NURSE PRACTITIONER

## 2025-04-10 PROCEDURE — 80061 LIPID PANEL: CPT | Performed by: NURSE PRACTITIONER

## 2025-04-10 RX ORDER — AMLODIPINE AND BENAZEPRIL HYDROCHLORIDE 5; 10 MG/1; MG/1
1 CAPSULE ORAL DAILY
Qty: 90 CAPSULE | Refills: 0 | Status: SHIPPED | OUTPATIENT
Start: 2025-04-10

## 2025-04-10 RX ORDER — LATANOPROST 50 UG/ML
1 SOLUTION/ DROPS OPHTHALMIC EVERY EVENING
Qty: 2.5 ML | Refills: 11 | Status: CANCELLED | OUTPATIENT
Start: 2025-04-10

## 2025-04-10 RX ORDER — METFORMIN HYDROCHLORIDE 500 MG/1
TABLET, EXTENDED RELEASE ORAL
Qty: 180 TABLET | Refills: 0 | Status: SHIPPED | OUTPATIENT
Start: 2025-04-10

## 2025-04-10 RX ORDER — AMLODIPINE AND BENAZEPRIL HYDROCHLORIDE 5; 10 MG/1; MG/1
1 CAPSULE ORAL DAILY
Qty: 90 CAPSULE | Refills: 3 | Status: SHIPPED | OUTPATIENT
Start: 2025-04-10 | End: 2025-04-10

## 2025-04-10 RX ORDER — LANCETS
EACH MISCELLANEOUS
Qty: 1000 EACH | Refills: 11 | Status: SHIPPED | OUTPATIENT
Start: 2025-04-10

## 2025-04-10 RX ORDER — PRAVASTATIN SODIUM 10 MG
10 TABLET ORAL AT BEDTIME
Qty: 90 TABLET | Refills: 3 | Status: SHIPPED | OUTPATIENT
Start: 2025-04-10

## 2025-04-10 RX ORDER — METFORMIN HYDROCHLORIDE 500 MG/1
TABLET, EXTENDED RELEASE ORAL
Qty: 180 TABLET | Refills: 3 | Status: SHIPPED | OUTPATIENT
Start: 2025-04-10 | End: 2025-04-10

## 2025-04-10 ASSESSMENT — PAIN SCALES - GENERAL: PAINLEVEL_OUTOF10: NO PAIN (0)

## 2025-04-10 NOTE — PROGRESS NOTES
"  {PROVIDER CHARTING PREFERENCE:903575}    Bjorn Mahajan is a 79 year old, presenting for the following health issues:  Diabetes (Refill metformin )  {(!) Visit Details have not yet been documented.  Please enter Visit Details and then use this list to pull in documentation. (Optional):611479}  HPI      {MA/LPN/RN Pre-Provider Visit Orders- hCG/UA/Strep (Optional):284355}  {SUPERLIST (Optional):526207}  {additonal problems for provider to add (Optional):370022}    {ROS Picklists (Optional):377630}      Objective    BP (!) 142/86 (BP Location: Right arm, Patient Position: Sitting, Cuff Size: Adult Regular)   Pulse 65   Temp 97.5  F (36.4  C) (Oral)   Resp 16   Ht 1.727 m (5' 8\")   Wt 100 kg (220 lb 6.4 oz)   SpO2 96%   BMI 33.51 kg/m    Body mass index is 33.51 kg/m .  Physical Exam   {Exam List (Optional):032616}    {Diagnostic Test Results (Optional):455855}        Signed Electronically by: Yazmin Trivedi NP  {Email feedback regarding this note to [email or web address]   :961710}  "

## 2025-04-10 NOTE — PROGRESS NOTES
Assessment & Plan     Type 2 diabetes mellitus with hyperglycemia, without long-term current use of insulin (H)  Chronic, stable, continue current treatment.   - blood glucose (NO BRAND SPECIFIED) test strip; Use to test blood sugar 1 times daily or as directed. To accompany: Blood Glucose Monitor Brands: per insurance.  - blood glucose monitoring (SOFTCLIX) lancets; Use to test blood sugar 1 times daily or as directed.  - Hemoglobin A1c; Future  - Comprehensive metabolic panel (BMP + Alb, Alk Phos, ALT, AST, Total. Bili, TP); Future  - metFORMIN (GLUCOPHAGE XR) 500 MG 24 hr tablet; TAKE 2 TABLETS BY MOUTH ONCE DAILY WITH SUPPER  - Comprehensive metabolic panel (BMP + Alb, Alk Phos, ALT, AST, Total. Bili, TP)    Type 2 diabetes mellitus with stage 3a chronic kidney disease, without long-term current use of insulin (H)  Chronic, stable, continue current treatment.   - Albumin Random Urine Quantitative with Creat Ratio; Future  - Lipid panel reflex to direct LDL Non-fasting; Future  - Hemoglobin; Future  - Albumin Random Urine Quantitative with Creat Ratio  - Lipid panel reflex to direct LDL Non-fasting  - Hemoglobin    Primary open angle glaucoma (POAG) of left eye, severe stage  Recommend following up with Ophthalmology    Primary open angle glaucoma (POAG) of right eye, mild stage  Recommend following up with Ophthalmology    Essential hypertension  Uncontrolled.  Patient reports he ran out of his medication which is why BP is elevated today.  Recommend going to pharmacy next door to get BP checked in 1 month.  - Comprehensive metabolic panel (BMP + Alb, Alk Phos, ALT, AST, Total. Bili, TP); Future  - amLODIPine-benazepril (LOTREL) 5-10 MG capsule; Take 1 capsule by mouth daily.  - Comprehensive metabolic panel (BMP + Alb, Alk Phos, ALT, AST, Total. Bili, TP)    Hyperlipidemia LDL goal <100  Chronic, stable, continue current treatment.   - pravastatin (PRAVACHOL) 10 MG tablet; Take 1 tablet (10 mg) by mouth at  "bedtime.    Stage 3a chronic kidney disease (H)  monitor  - Albumin Random Urine Quantitative with Creat Ratio; Future  - Hemoglobin; Future  - Hemoglobin A1c; Future  - Comprehensive metabolic panel (BMP + Alb, Alk Phos, ALT, AST, Total. Bili, TP); Future    Elevated prostate specific antigen (PSA)  Recommend Urology referral due to my concern that he could have elevated PSA due to prostate cancer or other.  Patient continues to decline Urology for consult.  States he is aware of risk of undiagnosed prostate cancer.  - PROSTATE SPEC ANTIGEN SCREEN; Future          BMI  Estimated body mass index is 33.51 kg/m  as calculated from the following:    Height as of this encounter: 1.727 m (5' 8\").    Weight as of this encounter: 100 kg (220 lb 6.4 oz).             Bjorn Mahajan is a 79 year old, presenting for the following health issues:  Diabetes (Refill metformin )        4/10/2025     9:26 AM   Additional Questions   Roomed by anderson MATHEW      Diabetes Follow-up    How often are you checking your blood sugar? A few times a week  What time of day are you checking your blood sugars (select all that apply)?  Before meals  Have you had any blood sugars above 200?  No  Have you had any blood sugars below 70?  No  What symptoms do you notice when your blood sugar is low?  None  What concerns do you have today about your diabetes? None   Do you have any of these symptoms? (Select all that apply)  No numbness or tingling in feet.  No redness, sores or blisters on feet.  No complaints of excessive thirst.  No reports of blurry vision.  No significant changes to weight.      BP Readings from Last 2 Encounters:   04/10/25 (!) 168/96   09/06/24 128/76     Hemoglobin A1C (%)   Date Value   04/10/2025 6.8 (H)   12/11/2023 7.1 (H)   05/06/2021 6.5 (H)     LDL Cholesterol Calculated (mg/dL)   Date Value   04/10/2025 62   12/11/2023 90   05/06/2021 84     LDL Cholesterol Direct (mg/dL)   Date Value   07/02/2013 107 "         How many servings of fruits and vegetables do you eat daily?  0-1  On average, how many sweetened beverages do you drink each day (Examples: soda, juice, sweet tea, etc.  Do NOT count diet or artificially sweetened beverages)?   0  How many days per week do you exercise enough to make your heart beat faster? Not currently   How many minutes a day do you exercise enough to make your heart beat faster? 0  How many days per week do you miss taking your medication? 0    Annual Wellness Visit     Patient has been advised of split billing requirements and indicates understanding: Yes          Health Care Directive  Patient does not have a Health Care Directive: Discussed advance care planning with patient; however, patient declined at this time.  In general, how would you rate your overall physical health? good  Do you have a special diet?  Diabetic      Do you see a dentist two times every year?  (!) NO  The patient was instructed to see the dentist every 6 months.   Have you been more tired than usual lately?  No  If you drink alcohol do you typically have >3 drinks per day or >7 drinks per week? No  Do you have a current opioid prescription? No  Do you use any other controlled substances or medications that are not prescribed by a provider? None  Social History     Tobacco Use    Smoking status: Former     Current packs/day: 0.00     Types: Cigarettes     Start date: 1968     Quit date: 1978     Years since quittin.3     Passive exposure: Never    Smokeless tobacco: Never   Vaping Use    Vaping status: Never Used   Substance Use Topics    Alcohol use: Not Currently    Drug use: Never       Needs assistance for the following daily activities: no assistance needed  Which of the following safety concerns are present in your home?  none identified   Do you (or your family members) have any concerns about your safety while driving?  No  Do you have any of the following hearing concerns?: No hearing  concerns  In the past 6 months, have you been bothered by leaking of urine? (!) YES   Information on urinary incontinence and treatment options given to patient.        4/10/2025   Social Factors   Worry food won't last until get money to buy more No   Food not last or not have enough money for food? No   Do you have housing? (Housing is defined as stable permanent housing and does not include staying ouside in a car, in a tent, in an abandoned building, in an overnight shelter, or couch-surfing.) Yes   Are you worried about losing your housing? No   Lack of transportation? No   Unable to get utilities (heat,electricity)? No         12/11/2023   Fall Risk   Fallen 2 or more times in the past year? No        Proxy-reported            ASCVD Risk   The 10-year ASCVD risk score (Valeriano SZYMANSKI, et al., 2019) is: 76%    Values used to calculate the score:      Age: 79 years      Sex: Male      Is Non- : No      Diabetic: Yes      Tobacco smoker: No      Systolic Blood Pressure: 168 mmHg      Is BP treated: Yes      HDL Cholesterol: 35 mg/dL      Total Cholesterol: 154 mg/dL            Reviewed and updated as needed this visit by Provider     Meds   Med Hx  Surg Hx  Fam Hx                Current providers sharing in care for this patient include:  Patient Care Team:  Yazmin Trivedi NP as PCP - General (Nurse Practitioner - Family)  Yazmin Trivedi NP as Assigned PCP  Sergey Herrera OD as MD (Optometrist)  Yazmin Trivedi NP as Referring Physician (Nurse Practitioner - Family)  Prabhjot Alonso MD as MD (Ophthalmology)  Prabhjot Alonso MD as Assigned Surgical Provider  Dev Pacheco PA-C as Physician Assistant (Physician Assistant - Medical)    The following health maintenance items are reviewed in Epic and correct as of today:  Health Maintenance   Topic Date Due    ZOSTER IMMUNIZATION (2 of 3) 04/12/2011    DTAP/TDAP/TD IMMUNIZATION (3 - Td or Tdap) 03/07/2018    RSV  "VACCINE (1 - 1-dose 75+ series) Never done    DIABETIC FOOT EXAM  10/12/2023    INFLUENZA VACCINE (1) 09/01/2024    COVID-19 Vaccine (4 - 2024-25 season) 09/01/2024    MEDICARE ANNUAL WELLNESS VISIT  12/11/2024    FALL RISK ASSESSMENT  12/11/2024    PHQ-2 (once per calendar year)  01/01/2025    Pneumococcal Vaccine: 50+ Years (3 of 3 - PCV20 or PCV21) 07/09/2025    A1C  10/10/2025    BMP  10/10/2025    MICROALBUMIN  10/10/2025    EYE EXAM  10/17/2025    LIPID  04/10/2026    PSA  04/10/2026    ANNUAL REVIEW OF HM ORDERS  04/10/2026    HEMOGLOBIN  04/10/2026    ADVANCE CARE PLANNING  04/10/2030    PARATHYROID  Completed    PHOSPHORUS  Completed    HEPATITIS C SCREENING  Completed    URINALYSIS  Completed    ALK PHOS  Completed    HPV IMMUNIZATION  Aged Out    MENINGITIS IMMUNIZATION  Aged Out       Appropriate preventive services were discussed with this patient, including applicable screening as appropriate for fall prevention, nutrition, physical activity, Tobacco-use cessation, weight loss and cognition.  Checklist reviewing preventive services available has been given to the patient.           No data to display                             Objective    BP (!) 168/96   Pulse 65   Temp 97.5  F (36.4  C) (Oral)   Resp 16   Ht 1.727 m (5' 8\")   Wt 100 kg (220 lb 6.4 oz)   SpO2 96%   BMI 33.51 kg/m    Body mass index is 33.51 kg/m .  Physical Exam   GENERAL: healthy, alert, no distress, and over weight  EYES: Eyes grossly normal to inspection, PERRL and conjunctivae and sclerae normal  HENT: ear canals and TM's normal, nose and mouth without ulcers or lesions  NECK: no adenopathy, no asymmetry, masses, or scars  RESP: lungs clear to auscultation - no rales, rhonchi or wheezes  CV: regular rate and rhythm, normal S1 S2, no S3 or S4, no murmur, click or rub, no peripheral edema  SKIN: no suspicious lesions or rashes  NEURO: Normal strength and tone, mentation intact and speech normal  PSYCH: mentation appears " normal, affect normal/bright    No results found for this or any previous visit (from the past 24 hours).        The longitudinal plan of care for the diagnosis(es)/condition(s) as documented were addressed during this visit.  Due to the added complexity in care, I will continue to support Keyshawn in the subsequent management and with ongoing continuity of care.    Yazmin Trivedi, MICHAEL, APRN, CNP    Note to patient: The 21st Century Cures Act makes medical notes like this available to patients in the interest of transparency. However, be advised this is a medical document. It is intended as [id number] communication. It is written in medical language and may contain abbreviations or verbiage that are unfamiliar. It may appear blunt or direct. Medical documents are intended to carry relevant information, facts as evident, and the clinical opinion of the practitioner.     Signed Electronically by: Yazmin Trivedi, CHRISTY

## 2025-04-10 NOTE — LETTER
April 14, 2025      Keyshawn Potter  [address] [facility] [address]        Dear ,    We are writing to inform you of your test results.    Your urine albumin level has increased which indicates progressing kidney damage.  Recommend working on checking your BP at home and get your BP to goal <130/80.  Recommend getting your Blood Pressure checked at the [facility] pharmacy next door to the clinic to get your Blood Pressure checked at the end of May once you have been back on your blood pressure medications for a month.   Prostate cancer screening PSA is elevated at 7.39 and I recommend that you see a Urologist for this.   Lipid panel shows triglycerides have risen.   Kidney function is stable in the chronic kidney disease stage 3 range.  Recommend avoiding working on keeping your blood sugars and blood pressure within goal.  Also recommend not using Nonsteroidal Anti-Inflammatory Drugs (Ibuprofen, Motrin, Advil, Aleve, Naproxen, Naprosyn) which can negatively impact kidney function.   Your goal Hemoglobin A1C is 7.9 or less.  Your last Hemoglobin A1C was 6.8.   Your goal Fasting Blood Sugar is less than 120.   Your goal 2 hours Post Meal Blood Sugar is less than 179.     If you have any questions or concerns please feel free to contact me at the office at [phone] or via [employer].     Your partner in health,     Yazmin Trivedi, DNP, APRN, CNP     Resulted Orders   PROSTATE SPEC ANTIGEN SCREEN   Result Value Ref Range    Prostate Specific Antigen Screen 7.39 (H) 0.00 - 6.50 ng/mL    Narrative    This result is obtained using the Roche Elecsys total PSA method on the sienna e801 immunoassay analyzer, which is an ultrasensitive method. Results obtained with different assay methods or kits cannot be used interchangeably.  This test is intended for initial prostate cancer screening. PSA values exceeding the age-specific limits are suspicious for prostate disease, but additional testing, such as  Care Due:                  Date            Visit Type   Department     Provider  --------------------------------------------------------------------------------                                EP -                              [facility]  Last Visit: 05-      CARE ([facility])   MICKIE Dubose                              EP -                              PRIMARY      [facility]  Next Visit: 08-      CARE ([facility])   MICKIE Dubose                                                            Last  Test          Frequency    Reason                     Performed    Due Date  --------------------------------------------------------------------------------    HBA1C.......  6 months...  glipiZIDE, metFORMIN.....  02- 08-    Uric Acid...  12 months..  allopurinoL..............  Not Found    Overdue    [employer] Embedded Care Due Messages. Reference number: [id number].   6/28/2023 6:07:26 PM [facility]ONE   prostate biopsy, is needed to diagnose prostate pathology. The American Cancer Society recommends annual examination with digital rectal examination and serum PSA beginning at age 50 and for men with a life expectancy of at least 10 years after detection of prostate cancer. For men in high-risk groups, such as  Americans or men with a first-degree relative diagnosed at a younger age, testing should begin at a younger age. It is generally recommended that information be provided to patients about the benefits and limitations of testing and treatment so they can make informed decisions.   Albumin Random Urine Quantitative with Creat Ratio   Result Value Ref Range    Creatinine Urine mg/dL 108.0 mg/dL      Comment:      The reference ranges have not been established in urine creatinine. The results should be integrated into the clinical context for interpretation.    Albumin Urine mg/L 312.0 mg/L      Comment:      The reference ranges have not been established in urine albumin. The results should be integrated into the clinical context for interpretation.    Albumin Urine mg/g Cr 288.89 (H) 0.00 - 17.00 mg/g Cr      Comment:      Microalbuminuria is defined as an albumin:creatinine ratio of 17 to 299 for males and 25 to 299 for females. A ratio of albumin:creatinine of 300 or higher is indicative of overt proteinuria.  Due to biologic variability, positive results should be confirmed by a second, first-morning random or 24-hour timed urine specimen. If there is discrepancy, a third specimen is recommended. When 2 out of 3 results are in the microalbuminuria range, this is evidence for incipient nephropathy and warrants increased efforts at glucose control, blood pressure control, and institution of therapy with an angiotensin-converting-enzyme (ACE) inhibitor (if the patient can tolerate it).     Lipid panel reflex to direct LDL Non-fasting   Result Value Ref Range    Cholesterol 154 <200 mg/dL    Triglycerides  285 (H) <150 mg/dL    Direct Measure HDL 35 (L) >=40 mg/dL    LDL Cholesterol Calculated 62 <100 mg/dL    Non HDL Cholesterol 119 <130 mg/dL    Patient Fasting > 8hrs? Yes     Narrative    Cholesterol  Desirable: < 200 mg/dL  Borderline High: 200 - 239 mg/dL  High: >= 240 mg/dL    Triglycerides  Normal: < 150 mg/dL  Borderline High: 150 - 199 mg/dL  High: 200-499 mg/dL  Very High: >= 500 mg/dL    Direct Measure HDL  Female: >= 50 mg/dL   Male: >= 40 mg/dL    LDL Cholesterol  Desirable: < 100 mg/dL  Above Desirable: 100 - 129 mg/dL   Borderline High: 130 - 159 mg/dL   High:  160 - 189 mg/dL   Very High: >= 190 mg/dL    Non HDL Cholesterol  Desirable: < 130 mg/dL  Above Desirable: 130 - 159 mg/dL  Borderline High: 160 - 189 mg/dL  High: 190 - 219 mg/dL  Very High: >= 220 mg/dL   Hemoglobin   Result Value Ref Range    Hemoglobin 16.3 13.3 - 17.7 g/dL   Comprehensive metabolic panel (BMP + Alb, Alk Phos, ALT, AST, Total. Bili, TP)   Result Value Ref Range    Sodium 135 135 - 145 mmol/L    Potassium 4.2 3.4 - 5.3 mmol/L    Carbon Dioxide (CO2) 21 (L) 22 - 29 mmol/L    Anion Gap 13 7 - 15 mmol/L    Urea Nitrogen 8.8 8.0 - 23.0 mg/dL    Creatinine 1.25 (H) 0.67 - 1.17 mg/dL    GFR Estimate 59 (L) >60 mL/min/1.73m2      Comment:      eGFR calculated using 2021 CKD-EPI equation.    Calcium 9.2 8.8 - 10.4 mg/dL    Chloride 101 98 - 107 mmol/L    Glucose 138 (H) 70 - 99 mg/dL    Alkaline Phosphatase 88 40 - 150 U/L    AST 28 0 - 45 U/L    ALT 18 0 - 70 U/L    Protein Total 7.5 6.4 - 8.3 g/dL    Albumin 4.4 3.5 - 5.2 g/dL    Bilirubin Total 0.5 <=1.2 mg/dL    Patient Fasting > 8hrs? Yes        If you have any questions or concerns, please call the clinic at the number listed above.       Sincerely,      Yazmin Trivedi NP    Electronically signed

## 2025-04-13 PROBLEM — N18.31 STAGE 3A CHRONIC KIDNEY DISEASE (H): Status: ACTIVE | Noted: 2025-04-13

## 2025-04-14 PROBLEM — N18.32 CHRONIC KIDNEY DISEASE, STAGE 3B (H): Status: RESOLVED | Noted: 2022-01-04 | Resolved: 2025-04-14

## 2025-04-14 PROBLEM — E66.09 CLASS 1 OBESITY DUE TO EXCESS CALORIES WITH SERIOUS COMORBIDITY AND BODY MASS INDEX (BMI) OF 33.0 TO 33.9 IN ADULT: Status: ACTIVE | Noted: 2025-04-14

## 2025-04-14 PROBLEM — E66.811 CLASS 1 OBESITY DUE TO EXCESS CALORIES WITH SERIOUS COMORBIDITY AND BODY MASS INDEX (BMI) OF 33.0 TO 33.9 IN ADULT: Status: ACTIVE | Noted: 2025-04-14

## 2025-04-14 PROBLEM — N18.31 TYPE 2 DIABETES MELLITUS WITH STAGE 3A CHRONIC KIDNEY DISEASE, WITHOUT LONG-TERM CURRENT USE OF INSULIN (H): Status: ACTIVE | Noted: 2024-01-23

## 2025-06-04 ENCOUNTER — OFFICE VISIT (OUTPATIENT)
Dept: FAMILY MEDICINE | Facility: CLINIC | Age: 80
End: 2025-06-04
Payer: COMMERCIAL

## 2025-06-04 VITALS
WEIGHT: 220.6 LBS | SYSTOLIC BLOOD PRESSURE: 128 MMHG | BODY MASS INDEX: 33.43 KG/M2 | OXYGEN SATURATION: 96 % | HEIGHT: 68 IN | RESPIRATION RATE: 16 BRPM | DIASTOLIC BLOOD PRESSURE: 60 MMHG | HEART RATE: 61 BPM | TEMPERATURE: 98 F

## 2025-06-04 DIAGNOSIS — H61.23 BILATERAL IMPACTED CERUMEN: Primary | ICD-10-CM

## 2025-06-04 DIAGNOSIS — E11.65 TYPE 2 DIABETES MELLITUS WITH HYPERGLYCEMIA, WITHOUT LONG-TERM CURRENT USE OF INSULIN (H): ICD-10-CM

## 2025-06-04 DIAGNOSIS — R39.9 LOWER URINARY TRACT SYMPTOMS (LUTS): ICD-10-CM

## 2025-06-04 PROBLEM — K86.2 PANCREATIC CYST: Status: ACTIVE | Noted: 2022-11-09

## 2025-06-04 PROCEDURE — 3074F SYST BP LT 130 MM HG: CPT | Performed by: NURSE PRACTITIONER

## 2025-06-04 PROCEDURE — 99213 OFFICE O/P EST LOW 20 MIN: CPT | Mod: 25 | Performed by: NURSE PRACTITIONER

## 2025-06-04 PROCEDURE — 3078F DIAST BP <80 MM HG: CPT | Performed by: NURSE PRACTITIONER

## 2025-06-04 PROCEDURE — 69209 REMOVE IMPACTED EAR WAX UNI: CPT | Mod: 50 | Performed by: NURSE PRACTITIONER

## 2025-06-04 PROCEDURE — 99207 PR FOOT EXAM NO CHARGE: CPT | Performed by: NURSE PRACTITIONER

## 2025-06-04 PROCEDURE — 1126F AMNT PAIN NOTED NONE PRSNT: CPT | Performed by: NURSE PRACTITIONER

## 2025-06-04 RX ORDER — SOLIFENACIN SUCCINATE 5 MG/1
5 TABLET, FILM COATED ORAL DAILY
Qty: 30 TABLET | Refills: 1 | Status: SHIPPED | OUTPATIENT
Start: 2025-06-04

## 2025-06-04 ASSESSMENT — PAIN SCALES - GENERAL: PAINLEVEL_OUTOF10: NO PAIN (0)

## 2025-06-04 NOTE — PROGRESS NOTES
"  Assessment & Plan     Bilateral impacted cerumen  Successful ear lavage by medical assistant.  - ND REMOVAL IMPACTED CERUMEN IRRIGATION/LVG UNILAT    Lower urinary tract symptoms (LUTS)  Discussed with patient the indication and use of medication(s), risks/benefits, and potential adverse side effects.  Patient/guardian verbalized understanding and agreement with the plan.   - Start solifenacin (VESICARE) 5 MG tablet; Take 1 tablet (5 mg) by mouth daily.  - Adult Urology  Referral; Future    Type 2 diabetes mellitus with hyperglycemia, without long-term current use of insulin (H)  Known issue that I take into account for their medical decisions, no current exacerbations or new concerns.   - FOOT EXAM          BMI  Estimated body mass index is 33.54 kg/m  as calculated from the following:    Height as of this encounter: 1.727 m (5' 8\").    Weight as of this encounter: 100.1 kg (220 lb 9.6 oz).             Bjorn Mahajan is a 79 year old, presenting for the following health issues:  Ear Problem (Unable to hear well in left ear )      6/4/2025    10:07 AM   Additional Questions   Roomed by Keena MATHEW      Diminished hearing in left ear.  No pain.    Still bothered by lower urinary tract symptoms.  Gets urinary continence to the point urine will drip down his legs.  Has tried Flomax and Terazosin in the past without improvement.  Has declined seeing urology in the past.              Objective    /60 (BP Location: Right arm, Patient Position: Sitting, Cuff Size: Adult Large)   Pulse 61   Temp 98  F (36.7  C) (Oral)   Resp 16   Ht 1.727 m (5' 8\")   Wt 100.1 kg (220 lb 9.6 oz)   SpO2 96%   BMI 33.54 kg/m    Body mass index is 33.54 kg/m .  Physical Exam   GENERAL: alert and no distress  HENT: both ears: occluded with wax and post ear irrigation by medical assistant the canals were free of wax  Feet:  monofilament test normal bilaterally.  Dry scaly skin around toes of left foot.        "     Signed Electronically by: PATRICIA Valera CNP

## 2025-06-05 ENCOUNTER — PATIENT OUTREACH (OUTPATIENT)
Dept: CARE COORDINATION | Facility: CLINIC | Age: 80
End: 2025-06-05
Payer: COMMERCIAL

## 2025-06-09 ENCOUNTER — PATIENT OUTREACH (OUTPATIENT)
Dept: CARE COORDINATION | Facility: CLINIC | Age: 80
End: 2025-06-09
Payer: COMMERCIAL

## 2025-08-04 DIAGNOSIS — E11.65 TYPE 2 DIABETES MELLITUS WITH HYPERGLYCEMIA, WITHOUT LONG-TERM CURRENT USE OF INSULIN (H): ICD-10-CM

## 2025-08-04 RX ORDER — METFORMIN HYDROCHLORIDE 500 MG/1
TABLET, EXTENDED RELEASE ORAL
Qty: 180 TABLET | Refills: 0 | Status: SHIPPED | OUTPATIENT
Start: 2025-08-04

## 2025-08-08 ENCOUNTER — TELEPHONE (OUTPATIENT)
Dept: FAMILY MEDICINE | Facility: CLINIC | Age: 80
End: 2025-08-08
Payer: COMMERCIAL

## 2025-08-08 DIAGNOSIS — I10 ESSENTIAL HYPERTENSION: ICD-10-CM

## 2025-08-08 RX ORDER — AMLODIPINE AND BENAZEPRIL HYDROCHLORIDE 5; 10 MG/1; MG/1
1 CAPSULE ORAL DAILY
Qty: 90 CAPSULE | Refills: 3 | Status: SHIPPED | OUTPATIENT
Start: 2025-08-08

## 2025-08-14 ENCOUNTER — TELEPHONE (OUTPATIENT)
Dept: FAMILY MEDICINE | Facility: CLINIC | Age: 80
End: 2025-08-14
Payer: COMMERCIAL

## 2025-08-19 ENCOUNTER — TELEPHONE (OUTPATIENT)
Dept: FAMILY MEDICINE | Facility: CLINIC | Age: 80
End: 2025-08-19
Payer: COMMERCIAL